# Patient Record
Sex: MALE | Race: WHITE | NOT HISPANIC OR LATINO | Employment: FULL TIME | URBAN - METROPOLITAN AREA
[De-identification: names, ages, dates, MRNs, and addresses within clinical notes are randomized per-mention and may not be internally consistent; named-entity substitution may affect disease eponyms.]

---

## 2017-01-16 ENCOUNTER — COMMUNICATION - HEALTHEAST (OUTPATIENT)
Dept: INTERNAL MEDICINE | Facility: CLINIC | Age: 47
End: 2017-01-16

## 2017-03-21 ENCOUNTER — COMMUNICATION - HEALTHEAST (OUTPATIENT)
Dept: NEUROSURGERY | Facility: CLINIC | Age: 47
End: 2017-03-21

## 2017-04-20 ENCOUNTER — COMMUNICATION - HEALTHEAST (OUTPATIENT)
Dept: INTERNAL MEDICINE | Facility: CLINIC | Age: 47
End: 2017-04-20

## 2017-05-21 ENCOUNTER — COMMUNICATION - HEALTHEAST (OUTPATIENT)
Dept: INTERNAL MEDICINE | Facility: CLINIC | Age: 47
End: 2017-05-21

## 2017-05-23 ENCOUNTER — COMMUNICATION - HEALTHEAST (OUTPATIENT)
Dept: SCHEDULING | Facility: CLINIC | Age: 47
End: 2017-05-23

## 2017-07-18 ENCOUNTER — COMMUNICATION - HEALTHEAST (OUTPATIENT)
Dept: INTERNAL MEDICINE | Facility: CLINIC | Age: 47
End: 2017-07-18

## 2017-09-07 ENCOUNTER — COMMUNICATION - HEALTHEAST (OUTPATIENT)
Dept: INTERNAL MEDICINE | Facility: CLINIC | Age: 47
End: 2017-09-07

## 2017-09-07 ENCOUNTER — COMMUNICATION - HEALTHEAST (OUTPATIENT)
Dept: SCHEDULING | Facility: CLINIC | Age: 47
End: 2017-09-07

## 2017-09-07 DIAGNOSIS — M50.20 CERVICAL HERNIATED DISC: ICD-10-CM

## 2017-09-11 ENCOUNTER — COMMUNICATION - HEALTHEAST (OUTPATIENT)
Dept: INTERNAL MEDICINE | Facility: CLINIC | Age: 47
End: 2017-09-11

## 2017-09-11 ENCOUNTER — AMBULATORY - HEALTHEAST (OUTPATIENT)
Dept: INTERNAL MEDICINE | Facility: CLINIC | Age: 47
End: 2017-09-11

## 2017-09-11 DIAGNOSIS — M54.2 NECK PAIN: ICD-10-CM

## 2017-09-12 ENCOUNTER — AMBULATORY - HEALTHEAST (OUTPATIENT)
Dept: INTERNAL MEDICINE | Facility: CLINIC | Age: 47
End: 2017-09-12

## 2017-09-12 ENCOUNTER — RECORDS - HEALTHEAST (OUTPATIENT)
Dept: ADMINISTRATIVE | Facility: OTHER | Age: 47
End: 2017-09-12

## 2017-09-12 ENCOUNTER — COMMUNICATION - HEALTHEAST (OUTPATIENT)
Dept: INTERNAL MEDICINE | Facility: CLINIC | Age: 47
End: 2017-09-12

## 2017-09-12 DIAGNOSIS — M54.2 NECK PAIN: ICD-10-CM

## 2017-09-13 ENCOUNTER — COMMUNICATION - HEALTHEAST (OUTPATIENT)
Dept: NEUROSURGERY | Facility: CLINIC | Age: 47
End: 2017-09-13

## 2017-09-14 ENCOUNTER — COMMUNICATION - HEALTHEAST (OUTPATIENT)
Dept: SCHEDULING | Facility: CLINIC | Age: 47
End: 2017-09-14

## 2017-09-14 ENCOUNTER — COMMUNICATION - HEALTHEAST (OUTPATIENT)
Dept: INTERNAL MEDICINE | Facility: CLINIC | Age: 47
End: 2017-09-14

## 2017-09-15 ENCOUNTER — RECORDS - HEALTHEAST (OUTPATIENT)
Dept: ADMINISTRATIVE | Facility: OTHER | Age: 47
End: 2017-09-15

## 2017-09-15 ENCOUNTER — AMBULATORY - HEALTHEAST (OUTPATIENT)
Dept: NEUROSURGERY | Facility: CLINIC | Age: 47
End: 2017-09-15

## 2017-09-15 DIAGNOSIS — M54.2 NECK PAIN: ICD-10-CM

## 2017-09-18 ENCOUNTER — RECORDS - HEALTHEAST (OUTPATIENT)
Dept: ADMINISTRATIVE | Facility: OTHER | Age: 47
End: 2017-09-18

## 2017-09-19 ENCOUNTER — HOSPITAL ENCOUNTER (OUTPATIENT)
Dept: RADIOLOGY | Facility: CLINIC | Age: 47
Discharge: HOME | End: 2017-09-19
Attending: NEUROLOGICAL SURGERY

## 2017-09-19 ENCOUNTER — COMMUNICATION - HEALTHEAST (OUTPATIENT)
Dept: INTERNAL MEDICINE | Facility: CLINIC | Age: 47
End: 2017-09-19

## 2017-09-19 ENCOUNTER — OFFICE VISIT - HEALTHEAST (OUTPATIENT)
Dept: NEUROSURGERY | Facility: CLINIC | Age: 47
End: 2017-09-19

## 2017-09-19 ENCOUNTER — AMBULATORY - HEALTHEAST (OUTPATIENT)
Dept: INTERNAL MEDICINE | Facility: CLINIC | Age: 47
End: 2017-09-19

## 2017-09-19 DIAGNOSIS — M50.10 CERVICAL DISC DISORDER WITH RADICULOPATHY: ICD-10-CM

## 2017-09-19 DIAGNOSIS — M54.2 NECK PAIN: ICD-10-CM

## 2017-09-19 DIAGNOSIS — F41.9 ANXIETY: ICD-10-CM

## 2017-09-19 ASSESSMENT — MIFFLIN-ST. JEOR: SCORE: 1759.83

## 2017-09-25 ENCOUNTER — HOSPITAL ENCOUNTER (OUTPATIENT)
Dept: PHYSICAL MEDICINE AND REHAB | Facility: CLINIC | Age: 47
Discharge: HOME | End: 2017-09-25
Attending: INTERNAL MEDICINE

## 2017-09-25 ENCOUNTER — OFFICE VISIT - HEALTHEAST (OUTPATIENT)
Dept: INTERNAL MEDICINE | Facility: CLINIC | Age: 47
End: 2017-09-25

## 2017-09-25 DIAGNOSIS — M50.20 CERVICAL HERNIATED DISC: ICD-10-CM

## 2017-09-25 DIAGNOSIS — M54.12 CERVICAL RADICULITIS: ICD-10-CM

## 2017-09-25 ASSESSMENT — MIFFLIN-ST. JEOR: SCORE: 1782.51

## 2017-09-26 ENCOUNTER — OFFICE VISIT - HEALTHEAST (OUTPATIENT)
Dept: PHYSICAL THERAPY | Facility: REHABILITATION | Age: 47
End: 2017-09-26

## 2017-09-26 DIAGNOSIS — M54.2 CERVICALGIA: ICD-10-CM

## 2017-09-26 DIAGNOSIS — M50.20 CERVICAL HERNIATED DISC: ICD-10-CM

## 2017-09-27 ENCOUNTER — RECORDS - HEALTHEAST (OUTPATIENT)
Dept: ADMINISTRATIVE | Facility: OTHER | Age: 47
End: 2017-09-27

## 2017-09-28 ENCOUNTER — OFFICE VISIT - HEALTHEAST (OUTPATIENT)
Dept: PHYSICAL THERAPY | Facility: REHABILITATION | Age: 47
End: 2017-09-28

## 2017-09-28 DIAGNOSIS — M50.20 CERVICAL HERNIATED DISC: ICD-10-CM

## 2017-09-28 DIAGNOSIS — M54.2 CERVICALGIA: ICD-10-CM

## 2017-09-29 ENCOUNTER — COMMUNICATION - HEALTHEAST (OUTPATIENT)
Dept: INTERNAL MEDICINE | Facility: CLINIC | Age: 47
End: 2017-09-29

## 2017-10-01 ENCOUNTER — RECORDS - HEALTHEAST (OUTPATIENT)
Dept: ADMINISTRATIVE | Facility: OTHER | Age: 47
End: 2017-10-01

## 2017-10-11 ENCOUNTER — COMMUNICATION - HEALTHEAST (OUTPATIENT)
Dept: INTERNAL MEDICINE | Facility: CLINIC | Age: 47
End: 2017-10-11

## 2017-10-20 ENCOUNTER — COMMUNICATION - HEALTHEAST (OUTPATIENT)
Dept: INTERNAL MEDICINE | Facility: CLINIC | Age: 47
End: 2017-10-20

## 2017-10-31 ENCOUNTER — OFFICE VISIT - HEALTHEAST (OUTPATIENT)
Dept: PHYSICAL THERAPY | Facility: REHABILITATION | Age: 47
End: 2017-10-31

## 2017-10-31 DIAGNOSIS — M54.2 CERVICALGIA: ICD-10-CM

## 2017-10-31 DIAGNOSIS — M50.20 CERVICAL HERNIATED DISC: ICD-10-CM

## 2017-11-03 ENCOUNTER — COMMUNICATION - HEALTHEAST (OUTPATIENT)
Dept: INTERNAL MEDICINE | Facility: CLINIC | Age: 47
End: 2017-11-03

## 2017-11-16 ENCOUNTER — COMMUNICATION - HEALTHEAST (OUTPATIENT)
Dept: INTERNAL MEDICINE | Facility: CLINIC | Age: 47
End: 2017-11-16

## 2018-01-03 ENCOUNTER — COMMUNICATION - HEALTHEAST (OUTPATIENT)
Dept: INTERNAL MEDICINE | Facility: CLINIC | Age: 48
End: 2018-01-03

## 2018-01-04 ENCOUNTER — COMMUNICATION - HEALTHEAST (OUTPATIENT)
Dept: INTERNAL MEDICINE | Facility: CLINIC | Age: 48
End: 2018-01-04

## 2018-02-16 ENCOUNTER — AMBULATORY - HEALTHEAST (OUTPATIENT)
Dept: INTERNAL MEDICINE | Facility: CLINIC | Age: 48
End: 2018-02-16

## 2018-05-03 ENCOUNTER — COMMUNICATION - HEALTHEAST (OUTPATIENT)
Dept: INTERNAL MEDICINE | Facility: CLINIC | Age: 48
End: 2018-05-03

## 2018-06-19 ENCOUNTER — AMBULATORY - HEALTHEAST (OUTPATIENT)
Dept: INTERNAL MEDICINE | Facility: CLINIC | Age: 48
End: 2018-06-19

## 2018-07-10 ENCOUNTER — COMMUNICATION - HEALTHEAST (OUTPATIENT)
Dept: INTERNAL MEDICINE | Facility: CLINIC | Age: 48
End: 2018-07-10

## 2018-09-20 ENCOUNTER — COMMUNICATION - HEALTHEAST (OUTPATIENT)
Dept: INTERNAL MEDICINE | Facility: CLINIC | Age: 48
End: 2018-09-20

## 2018-09-27 ENCOUNTER — COMMUNICATION - HEALTHEAST (OUTPATIENT)
Dept: INTERNAL MEDICINE | Facility: CLINIC | Age: 48
End: 2018-09-27

## 2018-10-02 ENCOUNTER — COMMUNICATION - HEALTHEAST (OUTPATIENT)
Dept: SCHEDULING | Facility: CLINIC | Age: 48
End: 2018-10-02

## 2018-10-03 ENCOUNTER — OFFICE VISIT - HEALTHEAST (OUTPATIENT)
Dept: INTERNAL MEDICINE | Facility: CLINIC | Age: 48
End: 2018-10-03

## 2018-10-03 ENCOUNTER — AMBULATORY - HEALTHEAST (OUTPATIENT)
Dept: INTERNAL MEDICINE | Facility: CLINIC | Age: 48
End: 2018-10-03

## 2018-10-03 DIAGNOSIS — R00.1 BRADYCARDIA: ICD-10-CM

## 2018-10-03 DIAGNOSIS — R07.9 CHEST PAIN: ICD-10-CM

## 2018-10-03 DIAGNOSIS — R10.9 FLANK PAIN: ICD-10-CM

## 2018-10-03 DIAGNOSIS — R07.89 ATYPICAL CHEST PAIN: ICD-10-CM

## 2018-10-03 LAB
ALBUMIN UR-MCNC: NEGATIVE MG/DL
APPEARANCE UR: CLEAR
ATRIAL RATE - MUSE: 69 BPM
BILIRUB UR QL STRIP: NEGATIVE
CHOLEST SERPL-MCNC: 203 MG/DL
COLOR UR AUTO: YELLOW
DIASTOLIC BLOOD PRESSURE - MUSE: 80 MMHG
FASTING STATUS PATIENT QL REPORTED: YES
FASTING STATUS PATIENT QL REPORTED: YES
GLUCOSE BLD-MCNC: 87 MG/DL (ref 70–125)
GLUCOSE UR STRIP-MCNC: NEGATIVE MG/DL
HDLC SERPL-MCNC: 49 MG/DL
HGB UR QL STRIP: NEGATIVE
INTERPRETATION ECG - MUSE: NORMAL
KETONES UR STRIP-MCNC: NEGATIVE MG/DL
LDLC SERPL CALC-MCNC: 123 MG/DL
LEUKOCYTE ESTERASE UR QL STRIP: NEGATIVE
NITRATE UR QL: NEGATIVE
P AXIS - MUSE: 70 DEGREES
PH UR STRIP: 7.5 [PH] (ref 5–8)
PR INTERVAL - MUSE: 160 MS
QRS DURATION - MUSE: 84 MS
QT - MUSE: 384 MS
QTC - MUSE: 411 MS
R AXIS - MUSE: 49 DEGREES
SP GR UR STRIP: 1.02 (ref 1–1.03)
SYSTOLIC BLOOD PRESSURE - MUSE: 128 MMHG
T AXIS - MUSE: 42 DEGREES
TRIGL SERPL-MCNC: 153 MG/DL
UROBILINOGEN UR STRIP-ACNC: NORMAL
VENTRICULAR RATE- MUSE: 69 BPM

## 2018-10-03 ASSESSMENT — MIFFLIN-ST. JEOR: SCORE: 1728.08

## 2018-10-04 ENCOUNTER — COMMUNICATION - HEALTHEAST (OUTPATIENT)
Dept: INTERNAL MEDICINE | Facility: CLINIC | Age: 48
End: 2018-10-04

## 2018-10-05 ENCOUNTER — COMMUNICATION - HEALTHEAST (OUTPATIENT)
Dept: INTERNAL MEDICINE | Facility: CLINIC | Age: 48
End: 2018-10-05

## 2018-10-22 ENCOUNTER — COMMUNICATION - HEALTHEAST (OUTPATIENT)
Dept: INTERNAL MEDICINE | Facility: CLINIC | Age: 48
End: 2018-10-22

## 2018-10-30 ENCOUNTER — COMMUNICATION - HEALTHEAST (OUTPATIENT)
Dept: INTERNAL MEDICINE | Facility: CLINIC | Age: 48
End: 2018-10-30

## 2018-11-19 ENCOUNTER — COMMUNICATION - HEALTHEAST (OUTPATIENT)
Dept: INTERNAL MEDICINE | Facility: CLINIC | Age: 48
End: 2018-11-19

## 2019-05-09 ENCOUNTER — COMMUNICATION - HEALTHEAST (OUTPATIENT)
Dept: INTERNAL MEDICINE | Facility: CLINIC | Age: 49
End: 2019-05-09

## 2019-05-13 ENCOUNTER — AMBULATORY - HEALTHEAST (OUTPATIENT)
Dept: NEUROSURGERY | Facility: CLINIC | Age: 49
End: 2019-05-13

## 2019-05-13 ENCOUNTER — COMMUNICATION - HEALTHEAST (OUTPATIENT)
Dept: NEUROSURGERY | Facility: CLINIC | Age: 49
End: 2019-05-13

## 2019-05-13 DIAGNOSIS — M54.2 NECK PAIN: ICD-10-CM

## 2019-05-13 DIAGNOSIS — M79.621 PAIN OF RIGHT UPPER ARM: ICD-10-CM

## 2019-05-20 ENCOUNTER — COMMUNICATION - HEALTHEAST (OUTPATIENT)
Dept: NEUROSURGERY | Facility: CLINIC | Age: 49
End: 2019-05-20

## 2019-05-21 ENCOUNTER — COMMUNICATION - HEALTHEAST (OUTPATIENT)
Dept: INTERNAL MEDICINE | Facility: CLINIC | Age: 49
End: 2019-05-21

## 2019-05-21 ENCOUNTER — COMMUNICATION - HEALTHEAST (OUTPATIENT)
Dept: SCHEDULING | Facility: CLINIC | Age: 49
End: 2019-05-21

## 2019-05-21 DIAGNOSIS — Z00.00 ROUTINE GENERAL MEDICAL EXAMINATION AT A HEALTH CARE FACILITY: ICD-10-CM

## 2019-05-28 ENCOUNTER — COMMUNICATION - HEALTHEAST (OUTPATIENT)
Dept: PHYSICAL MEDICINE AND REHAB | Facility: CLINIC | Age: 49
End: 2019-05-28

## 2019-05-28 DIAGNOSIS — M50.20 CERVICAL HERNIATED DISC: ICD-10-CM

## 2019-05-29 ENCOUNTER — COMMUNICATION - HEALTHEAST (OUTPATIENT)
Dept: INTERNAL MEDICINE | Facility: CLINIC | Age: 49
End: 2019-05-29

## 2019-05-30 ENCOUNTER — COMMUNICATION - HEALTHEAST (OUTPATIENT)
Dept: INTERNAL MEDICINE | Facility: CLINIC | Age: 49
End: 2019-05-30

## 2019-06-11 ENCOUNTER — COMMUNICATION - HEALTHEAST (OUTPATIENT)
Dept: INTERNAL MEDICINE | Facility: CLINIC | Age: 49
End: 2019-06-11

## 2019-06-11 DIAGNOSIS — Z00.00 ROUTINE GENERAL MEDICAL EXAMINATION AT A HEALTH CARE FACILITY: ICD-10-CM

## 2019-06-21 ENCOUNTER — COMMUNICATION - HEALTHEAST (OUTPATIENT)
Dept: INTERNAL MEDICINE | Facility: CLINIC | Age: 49
End: 2019-06-21

## 2019-06-21 DIAGNOSIS — M50.20 CERVICAL HERNIATED DISC: ICD-10-CM

## 2019-10-24 ENCOUNTER — COMMUNICATION - HEALTHEAST (OUTPATIENT)
Dept: TELEHEALTH | Facility: CLINIC | Age: 49
End: 2019-10-24

## 2019-10-24 ENCOUNTER — OFFICE VISIT - HEALTHEAST (OUTPATIENT)
Dept: INTERNAL MEDICINE | Facility: CLINIC | Age: 49
End: 2019-10-24

## 2019-10-24 DIAGNOSIS — Z00.00 ROUTINE GENERAL MEDICAL EXAMINATION AT A HEALTH CARE FACILITY: ICD-10-CM

## 2019-10-24 LAB
ALBUMIN SERPL-MCNC: 4.5 G/DL (ref 3.5–5)
ALBUMIN UR-MCNC: NEGATIVE MG/DL
ALP SERPL-CCNC: 75 U/L (ref 45–120)
ALT SERPL W P-5'-P-CCNC: 50 U/L (ref 0–45)
ANION GAP SERPL CALCULATED.3IONS-SCNC: 9 MMOL/L (ref 5–18)
APPEARANCE UR: CLEAR
AST SERPL W P-5'-P-CCNC: 42 U/L (ref 0–40)
BILIRUB SERPL-MCNC: 0.7 MG/DL (ref 0–1)
BILIRUB UR QL STRIP: NEGATIVE
BUN SERPL-MCNC: 15 MG/DL (ref 8–22)
CALCIUM SERPL-MCNC: 10.1 MG/DL (ref 8.5–10.5)
CHLORIDE BLD-SCNC: 105 MMOL/L (ref 98–107)
CHOLEST SERPL-MCNC: 215 MG/DL
CO2 SERPL-SCNC: 27 MMOL/L (ref 22–31)
COLOR UR AUTO: YELLOW
CREAT SERPL-MCNC: 0.89 MG/DL (ref 0.7–1.3)
ERYTHROCYTE [DISTWIDTH] IN BLOOD BY AUTOMATED COUNT: 12.4 % (ref 11–14.5)
FASTING STATUS PATIENT QL REPORTED: YES
GFR SERPL CREATININE-BSD FRML MDRD: >60 ML/MIN/1.73M2
GLUCOSE BLD-MCNC: 95 MG/DL (ref 70–125)
GLUCOSE UR STRIP-MCNC: NEGATIVE MG/DL
HCT VFR BLD AUTO: 49.3 % (ref 40–54)
HDLC SERPL-MCNC: 45 MG/DL
HGB BLD-MCNC: 16.7 G/DL (ref 14–18)
HGB UR QL STRIP: NEGATIVE
KETONES UR STRIP-MCNC: NEGATIVE MG/DL
LDLC SERPL CALC-MCNC: 142 MG/DL
LEUKOCYTE ESTERASE UR QL STRIP: NEGATIVE
MCH RBC QN AUTO: 31.1 PG (ref 27–34)
MCHC RBC AUTO-ENTMCNC: 33.8 G/DL (ref 32–36)
MCV RBC AUTO: 92 FL (ref 80–100)
NITRATE UR QL: NEGATIVE
PH UR STRIP: 6 [PH] (ref 5–8)
PLATELET # BLD AUTO: 237 THOU/UL (ref 140–440)
PMV BLD AUTO: 8.2 FL (ref 7–10)
POTASSIUM BLD-SCNC: 5.4 MMOL/L (ref 3.5–5)
PROT SERPL-MCNC: 7.2 G/DL (ref 6–8)
PSA SERPL-MCNC: 1.1 NG/ML (ref 0–2.5)
RBC # BLD AUTO: 5.35 MILL/UL (ref 4.4–6.2)
SODIUM SERPL-SCNC: 141 MMOL/L (ref 136–145)
SP GR UR STRIP: 1.02 (ref 1–1.03)
TRIGL SERPL-MCNC: 141 MG/DL
TSH SERPL DL<=0.005 MIU/L-ACNC: 1.39 UIU/ML (ref 0.3–5)
UROBILINOGEN UR STRIP-ACNC: NORMAL
WBC: 5.7 THOU/UL (ref 4–11)

## 2019-10-24 RX ORDER — SENNOSIDES 8.6 MG
650 CAPSULE ORAL EVERY 8 HOURS PRN
Status: SHIPPED | COMMUNITY
Start: 2019-10-24 | End: 2023-09-19

## 2019-10-24 ASSESSMENT — MIFFLIN-ST. JEOR: SCORE: 1768.91

## 2019-10-25 ENCOUNTER — COMMUNICATION - HEALTHEAST (OUTPATIENT)
Dept: INTERNAL MEDICINE | Facility: CLINIC | Age: 49
End: 2019-10-25

## 2019-11-05 ENCOUNTER — COMMUNICATION - HEALTHEAST (OUTPATIENT)
Dept: INTERNAL MEDICINE | Facility: CLINIC | Age: 49
End: 2019-11-05

## 2019-11-05 DIAGNOSIS — Z00.00 ROUTINE GENERAL MEDICAL EXAMINATION AT A HEALTH CARE FACILITY: ICD-10-CM

## 2019-11-25 ENCOUNTER — COMMUNICATION - HEALTHEAST (OUTPATIENT)
Dept: INTERNAL MEDICINE | Facility: CLINIC | Age: 49
End: 2019-11-25

## 2019-11-25 ENCOUNTER — COMMUNICATION - HEALTHEAST (OUTPATIENT)
Dept: SCHEDULING | Facility: CLINIC | Age: 49
End: 2019-11-25

## 2019-11-25 DIAGNOSIS — Z00.00 ROUTINE GENERAL MEDICAL EXAMINATION AT A HEALTH CARE FACILITY: ICD-10-CM

## 2019-12-23 ENCOUNTER — COMMUNICATION - HEALTHEAST (OUTPATIENT)
Dept: INTERNAL MEDICINE | Facility: CLINIC | Age: 49
End: 2019-12-23

## 2019-12-23 DIAGNOSIS — Z00.00 ROUTINE GENERAL MEDICAL EXAMINATION AT A HEALTH CARE FACILITY: ICD-10-CM

## 2020-05-20 ENCOUNTER — COMMUNICATION - HEALTHEAST (OUTPATIENT)
Dept: INTERNAL MEDICINE | Facility: CLINIC | Age: 50
End: 2020-05-20

## 2020-05-20 DIAGNOSIS — Z00.00 ROUTINE GENERAL MEDICAL EXAMINATION AT A HEALTH CARE FACILITY: ICD-10-CM

## 2020-05-21 RX ORDER — CYCLOBENZAPRINE HCL 5 MG
TABLET ORAL
Qty: 20 TABLET | Refills: 5 | Status: SHIPPED | OUTPATIENT
Start: 2020-05-21 | End: 2022-12-25

## 2020-09-02 ENCOUNTER — COMMUNICATION - HEALTHEAST (OUTPATIENT)
Dept: INTERNAL MEDICINE | Facility: CLINIC | Age: 50
End: 2020-09-02

## 2020-09-04 ENCOUNTER — OFFICE VISIT - HEALTHEAST (OUTPATIENT)
Dept: INTERNAL MEDICINE | Facility: CLINIC | Age: 50
End: 2020-09-04

## 2020-09-04 DIAGNOSIS — F41.9 ANXIETY: ICD-10-CM

## 2020-09-04 ASSESSMENT — PATIENT HEALTH QUESTIONNAIRE - PHQ9: SUM OF ALL RESPONSES TO PHQ QUESTIONS 1-9: 4

## 2020-09-15 ENCOUNTER — COMMUNICATION - HEALTHEAST (OUTPATIENT)
Dept: INTERNAL MEDICINE | Facility: CLINIC | Age: 50
End: 2020-09-15

## 2020-09-15 DIAGNOSIS — M50.20 CERVICAL HERNIATED DISC: ICD-10-CM

## 2020-09-16 RX ORDER — GABAPENTIN 300 MG/1
CAPSULE ORAL
Qty: 270 CAPSULE | Refills: 11 | Status: SHIPPED | OUTPATIENT
Start: 2020-09-16 | End: 2021-12-25

## 2020-12-15 ENCOUNTER — VIRTUAL VISIT (OUTPATIENT)
Dept: FAMILY MEDICINE | Facility: OTHER | Age: 50
End: 2020-12-15

## 2020-12-15 ENCOUNTER — AMBULATORY - HEALTHEAST (OUTPATIENT)
Dept: FAMILY MEDICINE | Facility: CLINIC | Age: 50
End: 2020-12-15

## 2020-12-15 DIAGNOSIS — Z20.822 SUSPECTED COVID-19 VIRUS INFECTION: ICD-10-CM

## 2020-12-15 NOTE — PROGRESS NOTES
"Date: 12/15/2020 09:40:21  Clinician: Sonal Bruno  Clinician NPI: [id number]  Patient: Otoniel Cook  Patient : 1970  Patient Address: [address] [place] [address]  Patient Phone: [phone]  Visit Protocol: URI  Patient Summary:  Otoniel is a 50 year old ( : 1970 ) male who initiated a [facility] Visit for COVID-19 (Coronavirus) evaluation and screening. When asked the question \"Please sign me up to receive news, health information and promotions. \", Otoniel responded \"No\".    When asked when his symptoms started, Otoniel reported that he does not have any symptoms.   He denies taking antibiotic medication in the past month and having recent facial or sinus surgery in the past 60 days.    Pertinent COVID-19 (Coronavirus) information  Otoniel does not work or volunteer as healthcare worker or a . In the past 14 days, Otoniel has not worked or volunteered at a healthcare facility or group living setting.   In the past 14 days, he also has not lived in a congregate living setting.   Otoniel has had a close contact with a laboratory-confirmed COVID-19 patient in the last 14 days. He was not exposed at his work. He does not know when he was exposed to the laboratory-confirmed COVID-19 patient.   Additional information about contact with COVID-19 (Coronavirus) patient as reported by the patient (free text): My child's classmate   Otoniel is not living in the same household with the COVID-19 positive patient. He was not in an enclosed space for greater than 15 minutes with the COVID-19 patient.   Since 2019, Otoniel has not been tested for COVID-19 and has had upper respiratory infection (URI) or influenza-like illness.      Date(s) of previous URI or influenza-like illness (free-text): Last 24 hours     Symptoms Otoniel experienced during previous URI or influenza-like illness as reported by the patient (free-text): Sneezing, headaches        Pertinent " medical history  He has not been told by his provider to avoid NSAIDs.   Otoniel does not have diabetes. He denies having immunosuppressive conditions (e.g., chemotherapy, HIV, organ transplant, long-term use of steroids or other immunosuppressive medications, splenectomy). He does not have severe COPD and congestive heart failure. He does not have asthma.   Otoniel needs a return to work/school note.   Weight: 200 lbs   Otoniel does not smoke or use smokeless tobacco.   Weight: 200 lbs    MEDICATIONS: gabapentin oral, ALLERGIES: gabapentin  Clinician Response:  Dear Otoniel,   Your symptoms show that you may have coronavirus (COVID-19). This illness can cause fever, cough and trouble breathing. Many people get a mild case and get better on their own. Some people can get very sick.  What should I do?  We would like to test you for this virus.   1. Please call [phone] to schedule your visit. Explain that you were referred by [facility] to have a COVID-19 test. Be ready to share your [facility] visit ID number.  * If you need to schedule in [facility] please call [phone] or for [employer] employees please call [phone].  * If you need to schedule in the [place] area please call [phone]. [place] employees call [phone].  The following will serve as your written order for this COVID Test, ordered by me, for the indication of suspected COVID [Z20.828]: The test will be ordered in [employer], our electronic health record, after you are scheduled. It will show as ordered and authorized by Randy Vernon MD.  Order: COVID-19 (Coronavirus) PCR for SYMPTOMATIC testing from [facility].   2. When it's time for your COVID test:  Stay at least 6 feet away from others. (If someone will drive you to your test, stay in the backseat, as far away from the  as you can.)   Cover your mouth and nose with a mask, tissue or washcloth.  Go straight to the testing site. Don't make any stops on the way there or back.       "3.Starting now: Stay home and away from others (self-isolate) until:   You've had no fever---and no medicine that reduces fever---for one full day (24 hours). And...   Your other symptoms have gotten better. For example, your cough or breathing has improved. And...   At least 10 days have passed since your symptoms started.       During this time, don't leave the house except for testing or medical care.   Stay in your own room, even for meals. Use your own bathroom if you can.   Stay away from others in your home. No hugging, kissing or shaking hands. No visitors.  Don't go to work, school or anywhere else.    Clean \"high touch\" surfaces often (doorknobs, counters, handles, etc.). Use a household cleaning spray or wipes. You'll find a full list of  on the EPA website: www.epa.gov/pesticide-registration/list-n-disinfectants-use-against-sars-cov-2.   Cover your mouth and nose with a mask, tissue or washcloth to avoid spreading germs.  Wash your hands and face often. Use soap and water.  Caregivers in these groups are at risk for severe illness due to COVID-19:  o People 65 years and older  o People who live in a nursing home or long-term care facility  o People with chronic disease (lung, heart, cancer, diabetes, kidney, liver, immunologic)  o People who have a weakened immune system, including those who:   Are in cancer treatment  Take medicine that weakens the immune system, such as corticosteroids  Had a bone marrow or organ transplant  Have an immune deficiency  Have poorly controlled HIV or AIDS  Are obese (body mass index of 40 or higher)  Smoke regularly   o Caregivers should wear gloves while washing dishes, handling laundry and cleaning bedrooms and bathrooms.  o Use caution when washing and drying laundry: Don't shake dirty laundry, and use the warmest water setting that you can.  o For more tips, go to www.cdc.gov/coronavirus/2019-ncov/downloads/10Things.pdf.    4.Sign up for Jose Angel Jose. We know " it's scary to hear that you might have COVID-19. We want to track your symptoms to make sure you're okay over the next 2 weeks. Please look for an email from [employer]---this is a free, online program that we'll use to keep in touch. To sign up, follow the link in the email. Learn more at [email or web address]  How can I take care of myself?   Get lots of rest. Drink extra fluids (unless a doctor has told you not to).   Take Tylenol (acetaminophen) for fever or pain. If you have liver or kidney problems, ask your family doctor if it's okay to take Tylenol.   Adults can take either:    650 mg (two 325 mg pills) every 4 to 6 hours, or...   1,000 mg (two 500 mg pills) every 8 hours as needed.    Note: Don't take more than 3,000 mg in one day. Acetaminophen is found in many medicines (both prescribed and over-the-counter medicines). Read all labels to be sure you don't take too much.   For children, check the Tylenol bottle for the right dose. The dose is based on the child's age or weight.    If you have other health problems (like cancer, heart failure, an organ transplant or severe kidney disease): Call your specialty clinic if you don't feel better in the next 2 days.       Know when to call 911. Emergency warning signs include:    Trouble breathing or shortness of breath Pain or pressure in the chest that doesn't go away Feeling confused like you haven't felt before, or not being able to wake up Bluish-colored lips or face.  Where can I get more information?    [employer] -- About COVID-19: [email or web address] [email or web address]/   CDC -- What to Do If You're Sick: www.cdc.gov/coronavirus/2019-ncov/about/steps-when-sick.html   CDC -- Ending Home Isolation: www.cdc.gov/coronavirus/2019-ncov/hcp/disposition-in-home-patients.html   CDC -- Caring for Someone: www.cdc.gov/coronavirus/2019-ncov/if-you-are-sick/care-for-someone.html   [facility] -- Interim Guidance for Hospital Discharge to Home:  www.health.[facility].mn.us/diseases/coronavirus/hcp/hospdischarge.pdf   [facility] clinical trials (COVID-19 research studies): clinicalaffairs.[facility]/umn-clinical-trials    Below are the COVID-19 hotlines at the [facility] ([facility]). Interpreters are available.    For health questions: Call 379-438-4037 or 1-606.269.9990 (7 a.m. to 7 p.m.) For questions about schools and childcare: Call 978-571-1039 or 1-676.660.6193 (7 a.m. to 7 p.m.)    Diagnosis: Contact with and (suspected) exposure to other viral communicable diseases  Diagnosis ICD: Z20.828

## 2020-12-16 ENCOUNTER — AMBULATORY - HEALTHEAST (OUTPATIENT)
Dept: FAMILY MEDICINE | Facility: CLINIC | Age: 50
End: 2020-12-16

## 2020-12-16 DIAGNOSIS — Z20.822 SUSPECTED COVID-19 VIRUS INFECTION: ICD-10-CM

## 2020-12-17 ENCOUNTER — COMMUNICATION - HEALTHEAST (OUTPATIENT)
Dept: SCHEDULING | Facility: CLINIC | Age: 50
End: 2020-12-17

## 2021-01-05 ENCOUNTER — COMMUNICATION - HEALTHEAST (OUTPATIENT)
Dept: ADMINISTRATIVE | Facility: CLINIC | Age: 51
End: 2021-01-05

## 2021-01-15 ENCOUNTER — HEALTH MAINTENANCE LETTER (OUTPATIENT)
Age: 51
End: 2021-01-15

## 2021-01-24 ENCOUNTER — COMMUNICATION - HEALTHEAST (OUTPATIENT)
Dept: INTERNAL MEDICINE | Facility: CLINIC | Age: 51
End: 2021-01-24

## 2021-01-24 DIAGNOSIS — Z00.00 ROUTINE GENERAL MEDICAL EXAMINATION AT A HEALTH CARE FACILITY: ICD-10-CM

## 2021-01-24 RX ORDER — IBUPROFEN 600 MG/1
TABLET, FILM COATED ORAL
Qty: 270 TABLET | Refills: 11 | Status: SHIPPED | OUTPATIENT
Start: 2021-01-24 | End: 2022-06-24

## 2021-02-23 ENCOUNTER — OFFICE VISIT - HEALTHEAST (OUTPATIENT)
Dept: INTERNAL MEDICINE | Facility: CLINIC | Age: 51
End: 2021-02-23

## 2021-02-23 DIAGNOSIS — R10.84 ABDOMINAL PAIN, GENERALIZED: ICD-10-CM

## 2021-02-23 DIAGNOSIS — Z00.00 ROUTINE GENERAL MEDICAL EXAMINATION AT A HEALTH CARE FACILITY: ICD-10-CM

## 2021-02-23 DIAGNOSIS — M25.552 HIP PAIN, LEFT: ICD-10-CM

## 2021-02-23 LAB
ALBUMIN SERPL-MCNC: 4.3 G/DL (ref 3.5–5)
ALBUMIN UR-MCNC: NEGATIVE MG/DL
ALP SERPL-CCNC: 66 U/L (ref 45–120)
ALT SERPL W P-5'-P-CCNC: 112 U/L (ref 0–45)
ANION GAP SERPL CALCULATED.3IONS-SCNC: 9 MMOL/L (ref 5–18)
APPEARANCE UR: CLEAR
AST SERPL W P-5'-P-CCNC: 56 U/L (ref 0–40)
BILIRUB SERPL-MCNC: 0.7 MG/DL (ref 0–1)
BILIRUB UR QL STRIP: NEGATIVE
BUN SERPL-MCNC: 13 MG/DL (ref 8–22)
CALCIUM SERPL-MCNC: 9.2 MG/DL (ref 8.5–10.5)
CHLORIDE BLD-SCNC: 102 MMOL/L (ref 98–107)
CHOLEST SERPL-MCNC: 240 MG/DL
CO2 SERPL-SCNC: 26 MMOL/L (ref 22–31)
COLOR UR AUTO: YELLOW
CREAT SERPL-MCNC: 0.86 MG/DL (ref 0.7–1.3)
ERYTHROCYTE [DISTWIDTH] IN BLOOD BY AUTOMATED COUNT: 12.4 % (ref 11–14.5)
FASTING STATUS PATIENT QL REPORTED: YES
GFR SERPL CREATININE-BSD FRML MDRD: >60 ML/MIN/1.73M2
GLUCOSE BLD-MCNC: 89 MG/DL (ref 70–125)
GLUCOSE UR STRIP-MCNC: NEGATIVE MG/DL
HCT VFR BLD AUTO: 47.4 % (ref 40–54)
HDLC SERPL-MCNC: 43 MG/DL
HGB BLD-MCNC: 16.3 G/DL (ref 14–18)
HGB UR QL STRIP: NEGATIVE
KETONES UR STRIP-MCNC: NEGATIVE MG/DL
LDLC SERPL CALC-MCNC: 143 MG/DL
LEUKOCYTE ESTERASE UR QL STRIP: NEGATIVE
MCH RBC QN AUTO: 31 PG (ref 27–34)
MCHC RBC AUTO-ENTMCNC: 34.4 G/DL (ref 32–36)
MCV RBC AUTO: 90 FL (ref 80–100)
NITRATE UR QL: NEGATIVE
PH UR STRIP: 6 [PH] (ref 5–8)
PLATELET # BLD AUTO: 223 THOU/UL (ref 140–440)
PMV BLD AUTO: 10.2 FL (ref 7–10)
POTASSIUM BLD-SCNC: 4.3 MMOL/L (ref 3.5–5)
PROT SERPL-MCNC: 7 G/DL (ref 6–8)
PSA SERPL-MCNC: 1.4 NG/ML (ref 0–2.5)
RBC # BLD AUTO: 5.26 MILL/UL (ref 4.4–6.2)
SODIUM SERPL-SCNC: 137 MMOL/L (ref 136–145)
SP GR UR STRIP: <=1.005 (ref 1–1.03)
TRIGL SERPL-MCNC: 269 MG/DL
TSH SERPL DL<=0.005 MIU/L-ACNC: 3.71 UIU/ML (ref 0.3–5)
UROBILINOGEN UR STRIP-ACNC: NORMAL
WBC: 8.2 THOU/UL (ref 4–11)

## 2021-02-23 ASSESSMENT — MIFFLIN-ST. JEOR: SCORE: 1832.41

## 2021-02-25 ENCOUNTER — TRANSFERRED RECORDS (OUTPATIENT)
Dept: HEALTH INFORMATION MANAGEMENT | Facility: CLINIC | Age: 51
End: 2021-02-25

## 2021-02-26 ENCOUNTER — COMMUNICATION - HEALTHEAST (OUTPATIENT)
Dept: INTERNAL MEDICINE | Facility: CLINIC | Age: 51
End: 2021-02-26

## 2021-02-26 DIAGNOSIS — E88.810 METABOLIC SYNDROME: ICD-10-CM

## 2021-02-26 RX ORDER — ROSUVASTATIN CALCIUM 5 MG/1
5 TABLET, COATED ORAL AT BEDTIME
Qty: 90 TABLET | Refills: 3 | Status: SHIPPED | OUTPATIENT
Start: 2021-02-26 | End: 2023-09-19

## 2021-03-10 ENCOUNTER — COMMUNICATION - HEALTHEAST (OUTPATIENT)
Dept: INTERNAL MEDICINE | Facility: CLINIC | Age: 51
End: 2021-03-10

## 2021-05-25 ENCOUNTER — RECORDS - HEALTHEAST (OUTPATIENT)
Dept: ADMINISTRATIVE | Facility: OTHER | Age: 51
End: 2021-05-25

## 2021-05-27 ASSESSMENT — PATIENT HEALTH QUESTIONNAIRE - PHQ9: SUM OF ALL RESPONSES TO PHQ QUESTIONS 1-9: 4

## 2021-05-28 ENCOUNTER — RECORDS - HEALTHEAST (OUTPATIENT)
Dept: ADMINISTRATIVE | Facility: CLINIC | Age: 51
End: 2021-05-28

## 2021-05-28 NOTE — TELEPHONE ENCOUNTER
Controlled Substance Refill Request  Medication:   Requested Prescriptions     Pending Prescriptions Disp Refills     HYDROcodone-acetaminophen 5-325 mg per tablet [Pharmacy Med Name: HYDROCOD-ACETAMIN 5-325 TAB 5-325 TAB] 12 tablet 0     Sig: TAKE 1 TABLET BY MOUTH 2 TIMES A DAY.     Date Last Fill: not on med list  Pharmacy: [facility]   Submit electronically to pharmacy  Controlled Substance Agreement on File:   Encounter-Level CSA Scan Date:    There are no encounter-level csa scan date.       Last office visit: Last office visit pertaining to requested medication was 10/3/18.

## 2021-05-28 NOTE — TELEPHONE ENCOUNTER
Called and spoke with Oscar who reports a new onset of pain in his right arm since last week. Denies sensory or motor issues. Will try a course of steroids. Enc to take ES Tylenol reminded about ice/heat, positioning. F/u on 6/7/2019.  Vero Cuellar RN, CNRN

## 2021-05-28 NOTE — TELEPHONE ENCOUNTER
Called pt and asked why he needs this medication as it is not on his medication list. Pt has right shoulder pain down to the palm of his hand. He has been using Gabapentin and Ibuprofen for the pain and it's not helping. Please advise on Monday.

## 2021-05-28 NOTE — TELEPHONE ENCOUNTER
Patient coming in on 6/7/19 to see Dr. Lambert. Previous laminectomy leandro Lambert. No recent imaging. Pls order imaging needed for appt.    Also, patient inquired if Dr. Lambert would be willing to order a steroid taper since patient is in a great deal of pain.    Pls call [phone].    **Patient does not have insurance until 6/1/19 and it will be [facility]. Pt does not have any ID numbers yet.

## 2021-05-28 NOTE — TELEPHONE ENCOUNTER
"Patient Returning Call  Reason for call:  Patient called back.  Information relayed to patient:  Informed of message listed below.  Patient states he does not have insurance until the first of the month. Is asking \"Tuyet\" specifically to call him back today.  Patient has additional questions:  Yes  If YES, what are your questions/concerns:  As above.  Okay to leave a detailed message?: Yes  "

## 2021-05-28 NOTE — TELEPHONE ENCOUNTER
Patient is calling to get a refill of the steroid that was prescribed to him by Dr. Lambert as it helps a lot with the pain. [facility] is the best pharmacy. Best call back number [phone], please inform patient either way if it is going to be refilled or if it is not.

## 2021-05-28 NOTE — TELEPHONE ENCOUNTER
Called and spoke with Oscar who reported a moderate pain in his neck, shoulder, elbow, forearm and fingers. Took a course of steroids with some relief. Instructed Oscar to try OTC remedies like Lidocaine patches, Salonpas, IcyHot, etc. Reminded about ice/heat, positioning. F/u appt on 6/6/2019.  Vero Cuellar RN, CNRN

## 2021-05-29 NOTE — TELEPHONE ENCOUNTER
Evelyn for trazodone 50 mg tablets number 30 tablets take 1 tablet at bedtime with 2 refills.  Please call patient and his pharmacy.

## 2021-05-29 NOTE — TELEPHONE ENCOUNTER
New Appointment Needed  What is the reason for the visit:    OV  Provider Preference: PCP only  How soon do you need to be seen?: 06/03/19 only  Waitlist offered?: No  Okay to leave a detailed message:  Yes      Patient stating he has ruptured disc in neck that Rosalia is aware of.  Patient states he is taking gabapentin for this and will be out after Monday. Patient states that he will only see Dr. Lindsey, and only Monday 06/03.  Dr. Lindsey only has 1 INF/EDF slot open that day.

## 2021-05-29 NOTE — TELEPHONE ENCOUNTER
Dr. Lindsey,  Spoke with the patient and relayed message below.  Patient would like to know if there is anything else that he might be able to take as he states that Trazodone has given him terrible nightmares in the past.  Bella LARA, MICHEL/CMT....................2:39 PM

## 2021-05-29 NOTE — TELEPHONE ENCOUNTER
He could try like I use Benadryl 25 mg at bedtime over-the-counter or melatonin 1 to 6 mg at bedtime over-the-counter these can be used in conjunction with each other.

## 2021-05-29 NOTE — TELEPHONE ENCOUNTER
Ok but I have to leave early that day  My last patient on Monday 6-3-19 should be 2:00pm and there are pts on my schedule at 2:40pm and 2:20 pm ; please move them up in the day

## 2021-05-29 NOTE — TELEPHONE ENCOUNTER
PERFECTOI - called patient back to offer appt for Monday. Patient reports that he is actually scheduled to see Dr. Lambert on 6/6/19 and will see what his recommendations are. Patient will plan to f/u appt appt with Dr. Lambert.

## 2021-05-29 NOTE — TELEPHONE ENCOUNTER
New Appointment Needed  What is the reason for the visit:  Same day/next day  Same Date/Next Day Appt Request  What is the reason for your visit?: neck pain. Possible disk in back per patient    Provider Preference: PCP only  How soon do you need to be seen?: tomorrow  Waitlist offered?: No  Okay to leave a detailed message:  Yes

## 2021-05-29 NOTE — TELEPHONE ENCOUNTER
"Patient calling stating \"I think I ruptured another disk in my neck, I had surgery several years ago, long story short I need something for sleep, I'm seeing Dr. Lambert the surgeon on June 6th and would like Dr. Lindsey to prescribe me something to help me sleep at night. Dr. Lindsey is aware of my symptoms\"    Per protocol, PCP to respond to patients requests for a new prescription for sleep within 24 hours. Please advise.    Reason for Disposition    Caller requesting a NON-URGENT new prescription or refill and triager unable to refill per unit policy    Protocols used: MEDICATION QUESTION CALL-A-AH      "

## 2021-05-29 NOTE — TELEPHONE ENCOUNTER
PLEASE OFFER PT 6/3/2019/ @9 :20 OR 9:40 SPOKE WIT PT AND HE NEEDS to CHECK SCHEDULE BEFOR MAKING APPT

## 2021-05-31 VITALS — BODY MASS INDEX: 25.73 KG/M2 | WEIGHT: 190 LBS | HEIGHT: 72 IN

## 2021-05-31 VITALS — WEIGHT: 195 LBS | HEIGHT: 72 IN | BODY MASS INDEX: 26.41 KG/M2

## 2021-05-31 VITALS — WEIGHT: 195 LBS | BODY MASS INDEX: 26.45 KG/M2

## 2021-06-02 VITALS — WEIGHT: 183 LBS | HEIGHT: 72 IN | BODY MASS INDEX: 24.79 KG/M2

## 2021-06-02 NOTE — PROGRESS NOTES
Office Visit - Physical    Otoniel Cook   49 y.o. male    Date of Visit: 10/24/2019    Chief Complaint   Patient presents with     Annual Exam     Physical Exam   fasting     Pain     right shoulder and right hip     Back Pain     lower       Subjective: Physical examination.    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  Right shoulder pain for 2 months months chest x-ray.  Smokes cigarettes about 2 packs/week.    12-18 beers per week occasional vodka.    The patient has no known drug allergies.    ROS: A comprehensive review of systems was performed and was otherwise negative    Medications:   Prior to Admission medications    Medication Sig Start Date End Date Taking? Authorizing Provider   acetaminophen (TYLENOL) 650 MG CR tablet Take 650 mg by mouth every 8 (eight) hours as needed for pain.   Yes PROVIDER, HISTORICAL   ibuprofen (ADVIL,MOTRIN) 600 MG tablet TAKE 1 TABLET THREE TIMES A DAY. 9/20/18  Yes Neeraj Lindsey MD   gabapentin (NEURONTIN) 300 MG capsule TAKE 1 CAPSULE (300 MG TOTAL) BY MOUTH AT BEDTIME. 6/12/19 6/11/20  Neeraj Lindsey MD   gabapentin (NEURONTIN) 300 MG capsule Take 600 mg three times daily. 6/21/19   Duke Rodas MD   hydrOXYzine pamoate (VISTARIL) 25 MG capsule TAKE 1 CAPSULE (25 MG) BY MOUTH 4 TIMES A DAY AS NEEDED 11/1/18   Neeraj Lindsey MD   traZODone (DESYREL) 50 MG tablet Take 1 tablet (50 mg total) by mouth at bedtime. 5/21/19   Neeraj Lindsey MD   cyclobenzaprine (FLEXERIL) 5 MG tablet TAKE 1 TABLET BY MOUTH TWICE A DAY 11/20/18 10/24/19  Neeraj Lindsey MD       Allergies:No Known Allergies    Immunizations:   Immunization History   Administered Date(s) Administered     Td,adult,historic,unspecified 06/26/2006     Tdap 06/26/2006     Tetanus Toxoid, Adsorbed 04/14/2015       Health Maintenance: Immunizations reviewed and up-to-date.    Past Medical History: History of vasovagal syncope and history of  multiple concussions.  Atypical chest pain with negative work-up.  History of cervical disc disease requiring surgery Dr. Pastor Lambert.  C5.    Past Surgical History: C5 cervical neck surgery by Dr. Pastor Lambert.  Postoperative pain problems.    Family History: Mother and dad both living father had Pancoast tumor resected late 70s [facility].  Mother living and well late 70s as well.  2 children ages 17 and 14 both enjoyed good health.  2 daughters.  Patient's wife is well also patient of this examiner.    Social History: .  Feeling the stress of his job the physical aspects are affecting the joints and hip shoulder right side.    Exam Chest clear to auscultation and percussion.  Heart tones regular rhythm without murmur rub or gallop.  Abdomen soft nontender no organomegaly.  No peritoneal signs.  Extremities free of edema cyanosis or clubbing.  Neck veins nondistended no thyromegaly or scleral icterus noted, carotids full.  Skin warm and dry easily conversant good spirited.  Normal intelligence.  Neurologically intact no gross localizing findings.  Skin negative lymph negative neuro negative psych normal HEENT negative back straight no severe spine tenderness good pulse noted in all 4 extremities genital rectal exam negative no carotid bruits or thyromegaly.    Assessment and Plan  General medical examination at healthcare facility check hemogram comprehensive metabolic profile plus urinalysis lipid panel PSA TSH and chest x-ray today.    The following high BMI interventions were performed this visit: encouragement to exercise    Neeraj Lindsey MD    Patient Active Problem List   Diagnosis     Sinus Bradycardia     Atypical Chest Pain     General medical exam     Vasovagal syncope     H/O multiple concussions     Cervical herniated disc

## 2021-06-03 VITALS
SYSTOLIC BLOOD PRESSURE: 120 MMHG | HEIGHT: 72 IN | BODY MASS INDEX: 26.01 KG/M2 | WEIGHT: 192 LBS | HEART RATE: 66 BPM | OXYGEN SATURATION: 97 % | DIASTOLIC BLOOD PRESSURE: 72 MMHG

## 2021-06-03 NOTE — TELEPHONE ENCOUNTER
RN cannot approve Refill Request    RN can NOT refill this medication med is not covered by policy/route to provider. Last office visit: 9/25/2017 Neeraj Lindsey MD Last Physical: 10/24/2019 Last MTM visit: Visit date not found Last visit same specialty: 10/3/2018 Koki Reyna MD.  Next visit within 3 mo: Visit date not found  Next physical within 3 mo: Visit date not found      Grace Godoy, [facility] Triage/Med Refill 11/5/2019    Requested Prescriptions   Pending Prescriptions Disp Refills     ibuprofen (ADVIL,MOTRIN) 600 MG tablet [Pharmacy Med Name: IBUPROFEN 600 MG TABLET 600 TAB] 270 tablet 11     Sig: TAKE 1 TABLET THREE TIMES A DAY.       There is no refill protocol information for this order

## 2021-06-03 NOTE — TELEPHONE ENCOUNTER
RN Triage:   Patient is calling in stating he thinks he has a sinus infection. He has sinus pressure and congested under the eyes and on the forehead. NO fever. Patient stated that he had a cold last week on Monday. Patient had ear pain last week in the right ear. He stated he had dizziness/lightheaded yesterday morning. He has greenish drainage from the nose. Patient is going out of town, driving to [place] with his family. [facility]. Patient declined an appointment to be seen. He is asking for a z-pack to be called in, in case he needs it while out of town. Patient deferred anymore triage questions and just wanted a message sent.       Contact number [phone] ok to leave a detailed message.   Please advise.     Dominique Curran RN, BSN [facility] Triage Nurse        Reason for Disposition    Earache    Protocols used: SINUS PAIN AND CONGESTION-A-[place]

## 2021-06-03 NOTE — TELEPHONE ENCOUNTER
Patient notified and prescription set up in a separate refill encounter.    Clementina Melvin, CMA

## 2021-06-04 NOTE — TELEPHONE ENCOUNTER
Medication Request  Medication name:   1. Cylclobenzaprine    Pharmacy Name and Location:   [facility],  [address]    Reason for request: Pt. Is having intense back spasms As he pulled a muscle in ihs back this am .  Please advise     When did you use medication last?:    Past year     Patient offered appointment:  Patient Declined .     Okay to leave a detailed message: yes

## 2021-06-04 NOTE — TELEPHONE ENCOUNTER
Dr. Lindsey,  Please advise on a dose, sig, and quantity of cyclobenzaprine.  Thank you.  Bella LARA, MICHEL/CMT....................10:12 AM

## 2021-06-04 NOTE — TELEPHONE ENCOUNTER
Evelyn Jaime for prescription requested below?  If so, please advise on a dose, sig, and quantity.  Thank you.  Bella LARA, MICHEL/CMT....................9:52 AM

## 2021-06-04 NOTE — TELEPHONE ENCOUNTER
Spoke with the patient and relayed message below from Dr. Lindsey.  He verbalized understanding and had no further questions at this time.    Prescription has been set up for Dr. Lindsey to review.  Bella LARA, MICHEL/CMT....................10:49 AM

## 2021-06-04 NOTE — TELEPHONE ENCOUNTER
Cyclobenzaprine 5 mg tablets number 20 tablets take 1 tablet twice daily and 1 refill.  Okay to use cyclobenzaprine with arthritis strength acetaminophen 2 tablets 3 times a day and /or Advil 3 tablets twice daily with food.

## 2021-06-05 VITALS
WEIGHT: 206 LBS | HEIGHT: 72 IN | TEMPERATURE: 96.8 F | HEART RATE: 61 BPM | BODY MASS INDEX: 27.9 KG/M2 | OXYGEN SATURATION: 96 % | DIASTOLIC BLOOD PRESSURE: 88 MMHG | SYSTOLIC BLOOD PRESSURE: 126 MMHG

## 2021-06-11 NOTE — TELEPHONE ENCOUNTER
Who is calling:  Patient  Reason for Call:  Patient had concerns about his anxiety, was offered appointment, patient denied  Date of last appointment with primary care: 10/24/2019  Okay to leave a detailed message: Yes
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
## 2021-06-11 NOTE — TELEPHONE ENCOUNTER
RN cannot approve Refill Request    RN can NOT refill this medication med is not covered by policy/route to provider. Last office visit: Visit date not found Last Physical: Visit date not found Last MTM visit: Visit date not found Last visit same specialty: 10/3/2018 Koki Reyna MD.  Next visit within 3 mo: Visit date not found  Next physical within 3 mo: Visit date not found      Renetta Fung, [facility] Triage/Med Refill 9/16/2020    Requested Prescriptions   Pending Prescriptions Disp Refills     gabapentin (NEURONTIN) 300 MG capsule [Pharmacy Med Name: GABAPENTIN 300 MG CAPSULE 300 CAP] 270 capsule 11     Sig: TAKE 2 CAPSULES (600 MG) BY MOUTH THREE TIMES DAILY.       Gabapentin/Levetiracetam/Tiagabine Refill Protocol  Passed - 9/15/2020 10:50 AM        Passed - PCP or prescribing provider visit in past 12 months or next 3 months     Last office visit with prescriber/PCP: Visit date not found OR same dept: Visit date not found OR same specialty: 10/3/2018 Koki Reyna MD  Last physical: Visit date not found Last MTM visit: Visit date not found   Next visit within 3 mo: Visit date not found  Next physical within 3 mo: Visit date not found  Prescriber OR PCP: Duke Rodas MD  Last diagnosis associated with med order: 1. Cervical herniated disc  - gabapentin (NEURONTIN) 300 MG capsule [Pharmacy Med Name: GABAPENTIN 300 MG CAPSULE 300 CAP]; TAKE 2 CAPSULES (600 MG) BY MOUTH THREE TIMES DAILY.  Dispense: 270 capsule; Refill: 11    If protocol passes may refill for 12 months if within 3 months of last provider visit (or a total of 15 months).

## 2021-06-11 NOTE — TELEPHONE ENCOUNTER
Spoke with patient and he has been feeling anxious recently and wants to get a prescription for these occasions. Please advise. Prescription should be sent to the [facility] in the chart.    Clementina Melvin, CMA

## 2021-06-13 NOTE — PROGRESS NOTES
"Assessment:   Otoniel Cook is a 47 y.o. y.o. male who is otherwise healthy who presents today for follow-up regarding week history of right neck pain with radiation into the right upper extremity area the patient has a history of a left C6-7 laminectomy in December 29, 2015 with Dr. Lambert.  An updated MRI cervical spine shows a right broad-based disc protrusion at C4-5 with mild encroachment upon the right C5 nerve root.  Patient also has chronic severe bilateral foraminal stenosis at C5-6, but this has not changed since 2004.  The patient had a slightly diminished right biceps reflex but was otherwise neurologically intact.       Plan:     A shared decision making plan was used.  The patient's values and choices were respected.  The following represents what was discussed and decided upon by the physician assistant and the patient.      1.  DIAGNOSTIC TESTS: I reviewed the MRI cervical spine.  No further diagnostic tests were ordered.    2.  PHYSICAL THERAPY: I placed an order for the patient begin physical therapy at the [place] location of [facility] rehab.    3.  MEDICATIONS:    -I refilled the patient's gabapentin and given a dosage titration chart so that he can increase his dose of maximum of 900 mg 3 times daily.  -I provided a prescription for Medrol Dosepak.  I asked the patient to stop using ibuprofen while he is taking his Medrol Dosepak.  -I refilled the patient's ibuprofen 800 mg 3 times daily as needed.  He should resume this after he completes his Medrol Dosepak.  -The patient asked for additional pain relieving medication to \"take the edge off\" and help him sleep.  He has gotten Vicodin from his primary care provider.  I told the patient I do not feel comfortable providing additional opiates.  I told the patient I would prefer to treat his pain aggressively with nonopiate pain relieving medications, physical therapy, and potentially interventional pain management.      4.  INTERVENTIONS: No " interventions were ordered.  The patient fails to improve with conservative treatment, recommend a right C4-5 transforaminal epidural steroid injection.  If this does not help, we could consider a C5-6 transforaminal epidural steroid injection.    5.  PATIENT EDUCATION: The patient shared with me that he has been out of work since September 11 due to this pain.  The patient states that he dropped off disability paperwork and his primary care provider's office last week.  She would like to get back to work as soon as possible.  I told the patient that I believe he could return to work with light duty restrictions.    6.  FOLLOW-UP: I will see the patient back in the clinic in 3 weeks for follow-up.  If he has any questions or concerns in the meantime, he should not hesitate to contact our clinic.    Subjective:     Otoniel Cook is a 47 y.o. male who presents today for follow-up regarding a 3-4 week history of right neck pain with radiation into the right upper extremity.  The patient 2015 for left upper extremity radicular pain.  He went on to have a left C6-7 laminectomy with Dr. Lambert on December 29, 2015.  The patient reports that it took him about 9 weeks to recover from that surgery, but ultimately his left upper extremity pain resolved and he is pleased with his outcome.  3-4 weeks ago, the patient began to experience right-sided pain.  He woke up with pain.  He thought that he is slept on the neck wrong.  It has become progressively more severe.  Over the past 3 days, he has also noticed similar but less severe pain in the left upper extremity.    The patient was of right neck pain.  The pain radiates into the right shoulder and down the right deltoid.  The patient states that the pain almost always ends proximal to the elbow.  He has had a couple of occasions with the pain extended into the extensor forearm.  He describes the pain as a deep, aching pain.  It is worse at night.  He is having difficulty  sleeping because of the pain.  His pain is also aggravated with sitting and coughing/sneezing.  His pain is alleviated with resting his right arm on top of his head and sitting in a recliner.  The patient states that for the past 3 days he has noticed mild pain in the left deltoid as well.  The patient says that he has had a couple episodes of tingling in the right palm.  He denies any numbness.  He feels weak in the right arm with reaching.  He rates his pain as a 5-10 out of 10.    The patient has not had any physical therapy for his neck.  I had referred him to physical therapy prior to his surgery in 2015.  I do not see any record that he actually went to physical therapy.  He did not do physical therapy after surgery.  The patient tried an epidural steroid injection before his surgery but he has not had any interventional pain management postoperatively.  The patient is currently taking gabapentin 300 mg every 8 hours and ibuprofen 800 mg every 8 hours.  He was prescribed Vicodin by his primary care provider but has run out.    The patient works as an .  He is currently working at [employer].  The patient states that he has not gone to work since September 11, 2017 because of his pain.  The patient states that he dropped off short-term disability paperwork at his primary care provider's office last week.    Past medical history is reviewed and is pertinent for the surgery in 2015 with Dr. Lambert.    Family history is reviewed and is unchanged in the interim.    Review of Systems:  Positive for numbness/tingling, weakness, poor sleep quality, back pain, joint pain, worry.  Negative loss of bowel/bladder control, footdrop, nausea/vomiting, balance changes, blurry vision.     Objective:   CONSTITUTIONAL:  Vital signs as above.  No acute distress.  The patient is well nourished and well groomed.    PSYCHIATRIC:  The patient is awake, alert, oriented to person, place and time.  The patient is  answering questions appropriately with clear speech.  Normal affect.  HEENT: Normocephalic, atraumatic.  Sclera clear.  Neck is supple.  SKIN:  Skin over the face, neck bilateral upper extremities is clean, dry, intact without rashes.  MUSCULOSKELETAL: The patient has 5/5 strength for the bilateral shoulder abductors, elbow flexors/extensors, wrist extensors, finger flexors/abductors.  Cervical extension is mildly restricted.  Otherwise cervical range of motion is within normal limits.  The patient did have a positive Spurling sign on the right.  NEUROLOGICAL: 1+ right and 2+ left biceps reflexes.  1+ bilateral triceps and brachioradialis reflexes.  .  Negative Regalado's bilaterally.  Sensation to light touch is intact over bilateral upper extremities throughout.    RESULTS: I reviewed the MRI cervical spine from [facility] dated September 12, 2017.  At C4-5 there is a new right-sided disc protrusion with mild encroachment on the right C5 nerve root.  The patient has chronic severe bilateral foraminal stenosis at C5-6 which is unchanged since his previous scan in 2004.  There are postoperative changes of a C6-7 laminectomy.  Please see report for further details.
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
## 2021-06-13 NOTE — PROGRESS NOTES
"[facility] Daily Progress     Patient Name: Otoniel Cook  Date: 9/28/2017  Visit #: 2/10 visits   PTA visit #:    Referral Diagnosis: Cervical radiculitis   Referring provider: Neeraj Lindsey MD  Visit Diagnosis:     ICD-10-CM    1. Cervical herniated disc M50.20    2. Cervicalgia M54.2          Assessment:   Patient reports no changes in pain since starting PT. He did not tolerate supine or hooklying positions today, so traction and manual therapy were not performed. HEP was progressed with gentle strengthening.   Patient is appropriate to continue with skilled physical therapy intervention, as indicated by initial plan of care.    Goal Status:  Pt. will be independent with home exercise program in : 4 weeks  Pt. will improve posture : and demonstrate posture with minimal to no cuing;2 minutes;in sitting;in standing;in 4 weeks  Patient Turn Head: for driving;for watching traffic;for conversation;for computer;for work;with less pain;in 4 weeks  Patient will look up / down: for shaving;for reading;for other;with less pain;in 4 weeks  No Data Recorded    Plan / Patient Education:     Continue with initial plan of care.  Progress with home program as tolerated.    Subjective:   No relief in symptoms thus far. Patient c/o intense B UE pain and cervical pain.  Patient would like a TENS unit.     Objective:     Pt does not tolerate supine or hooklying position due to L UE pain.    Treatment Today     TREATMENT MINUTES COMMENTS   Evaluation     Self-care/ Home management     Manual therapy     Neuromuscular Re-education     Therapeutic Activity     Therapeutic Exercises 25 Advanced HEP with the following:  Exercises:  Exercise #1: Chin tuck - cervical retraction in sitting  Comment #1: HEP x 5 seconds x 10 reps   Exercise #2: Seated scap retraction with ER L1 band  Comment #2: HEP x 5 seconds x 10 reps  Exercise #3: Pec stretch \"w\" in doorway  Comment #3: HEP x 30 seconds x 2-3 reps   Exercise #5: " Median nerve abduction   Comment #5: HEP x 10 reps B  Exercise #6: Standing rows  Comment #6: HEP x 3 seconds 10 reps L3 band      Gait training     Modality__________________                Total 25    Blank areas are intentional and mean the treatment did not include these items.       Glenny Yoder  9/28/2017

## 2021-06-13 NOTE — PROGRESS NOTES
[facility]   Cervical Thoracic Initial Evaluation    Patient Name: Otoniel Cook  Date of evaluation: 9/27/2017  Referral Diagnosis: Cervical herniated disc  Referring provider: Rosanne Monteiro PA-C  Visit Diagnosis:     ICD-10-CM    1. Cervical herniated disc M50.20    2. Cervicalgia M54.2        Assessment:       Otoniel Cook is a 47 y.o. male who presents to therapy today with chief complaints of neck pain. Onset date of sx was 17+ years ago, got worse last month.  Pt reported h/o cervical surgery.  Pain symptoms are 5/10.  Functional impairments include looking up/down, turning head.  Pt demo's signs and sx consistent with cervicalgia.     Pt. is appropriate for skilled PT intervention as outlined in the Plan of Care (POC).  Pt. is a good candidate for skilled PT services to improve pain levels and function.    Goals:  Pt. will be independent with home exercise program in : 4 weeks  Pt. will improve posture : and demonstrate posture with minimal to no cuing;2 minutes;in sitting;in standing;in 4 weeks  Patient Turn Head: for driving;for watching traffic;for conversation;for computer;for work;with less pain;in 4 weeks  Patient will look up / down: for shaving;for reading;for other;with less pain;in 4 weeks      Patient's expectations/goals are realistic.    Barriers to Learning or Achieving Goals:  Chronicity of the problem.       Plan / Patient Instructions:        Plan of Care:   Communication with: Referral Source  Patient Related Instruction: Nature of Condition;Treatment plan and rationale;Self Care instruction;Basis of treatment;Body mechanics;Posture;Precautions;Next steps;Expected outcome  Times per Week: 2  Number of Weeks: 5  Number of Visits: 10  Discharge Planning: patient will be discharge to self care  Precautions / Restrictions : none  Therapeutic Exercise: ROM;Stretching;Strengthening  Neuromuscular Reeducation: kinesio tape;posture  Manual Therapy: soft tissue  mobilization;myofascial release  Modalities: TENS;other  Modalities: TENS home unit if beneficial    Plan for next visit: check if TENS was beneficial, probably check about a home TENS unit?, mnaul therapy, posture, cervical exercises.     Subjective:         Social information:   Living Situation:single family home and lives with others    Occupation:.   Work Status:Working full time   Equipment Available: None    History of Present Illness:    Otoniel is a 47 y.o. male who presents to therapy today with complaints of neck pain and arm tingling. Date of onset/duration of symptoms is 17+ years, got worse last month. Onset was gradual. Symptoms are intermittent and not improving. He reports  an episodic  history of similar symptoms. He describes their previous level of function as not limited    Pain Ratin  Pain rating at best: 5  Pain rating at worst: 10  Pain description: aching and in the bone , both arms.    Functional limitations are described as occurring with:   ascending and descending stairs or curbs  bending  dependent care  looking up, looking down, looking at computer and turning head  personal cares dressing lower body, donning shirt or jacket, combing hair and washing hair  reaching at shoulder height and overhead  performing routine daily activities    Patient reports benefit from:  rest           Objective:      Note: Items left blank indicates the item was not performed or not indicated at the time of the evaluation.    Patient Outcome Measures :    Neck Disability Score in %: 70   Scores range from 0-100%, where a score of 0% represents minimal pain and maximal function. The minmal clinically important difference is a score reduction of 10%.    Cervical Thoracic Examination  1. Cervical herniated disc     2. Cervicalgia       Involved side: Bilateral  Posture Observation:      General standing posture is fair.  Cervical:  Moderate forward head    Cervical ROM:    Date: 17      *Indicate scale AROM AROM AROM   Cervical Flexion 15o  10o flexed forward posture     Cervical Extension       Right Left Right Left Right Left   Cervical Sidebending 22o 15o pain       Cervical Rotation         Cervical Protraction      Cervical Retraction      Thoracic Flexion      Thoracic Extension      Thoracic Sidebending         Thoracic Rotation           Strength   4/5    Sensation   Bilateral arm tingling to elbow level          Flexibility: limited cervical mobility.    Palpation: bilateral cervical and upper trap increased muscle spasm.      Bilateral scapular winging.       Treatment Today     TREATMENT MINUTES COMMENTS   Evaluation 30 low   Self-care/ Home management     Manual therapy     Neuromuscular Re-education     Therapeutic Activity     Therapeutic Exercises 15 Cervical flexion, lateral flexion, shoulder rolls, posture   Gait training     Modality_____TENS_____________ 15 Cervical 4 electrodes ,I:2.5, PPR: cervical, good skin tolreance              Total 60    Blank areas are intentional and mean the treatment did not include these items.       PT Evaluation Code: (Please list factors)  Patient History/Comorbidities: history of chronic back pain  Examination: cervical pain  Clinical Presentation: stable  Clinical Decision Making: low    Patient History/  Comorbidities Examination  (body structures and functions, activity limitations, and/or participation restrictions) Clinical Presentation Clinical Decision Making (Complexity)   No documented Comorbidities or personal factors 1-2 Elements Stable and/or uncomplicated Low   1-2 documented comorbidities or personal factor 3 Elements Evolving clinical presentation with changing characteristics Moderate   3-4 documented comorbidities or personal factors 4 or more Unstable and unpredictable High            Christina Vidal PT  9/26/2017  8:21 AM

## 2021-06-13 NOTE — PROGRESS NOTES
Neck pain and radiates into right shoulder and right arm to a little past the elbow. Deep ache in right arm. No numbness and tingling. Minor weakness in right hand. Gait and balance are good, and denies bladder or bowel incontinence.   Otoniel has been having symptoms for almost 3 weeks but not sure if injury. No conservative treatment. He is s/p Left C6-7 hemilaminectomy and foraminotomy by Dr. Lambert in 12/2015.  NDI today is 76%  Maria M,CMA

## 2021-06-13 NOTE — PROGRESS NOTES
[facility] Daily Progress     Patient Name: Otoniel Cook  Date: 10/31/2017  Visit #: 3/10 visits   PTA visit #:    Referral Diagnosis: Cervical radiculitis   Referring provider: Neeraj Lindsey MD  Visit Diagnosis:     ICD-10-CM    1. Cervical herniated disc M50.20    2. Cervicalgia M54.2          Assessment:     Patient reports feeling better after returning to work still gets a deep ache on right arm.  Patient is compliant with home  Exercise program.  Good tolerance to cervical traction today.  Requested orders for TENS home unit , patient gave permission to fax information to [facility] once we get the MD order.   Patient is appropriate to continue with skilled physical therapy intervention, as indicated by initial plan of care.    Goal Status:  Pt. will be independent with home exercise program in : 4 weeks  Pt. will improve posture : and demonstrate posture with minimal to no cuing;2 minutes;in sitting;in standing;in 4 weeks  Patient Turn Head: for driving;for watching traffic;for conversation;for computer;for work;with less pain;in 4 weeks  Patient will look up / down: for shaving;for reading;for other;with less pain;in 4 weeks      Plan / Patient Education:     Continue with initial plan of care.  Progress with home program as tolerated.   Check home TENS unit orders.    Subjective:     Cervical pain is with the medicine is 3/10 and when he stops taking med goes up to 7/10.     Objective:     Cervical muscle spasm.  Patient is independent ambulatory.    Treatment Today     TREATMENT MINUTES COMMENTS   Evaluation     Self-care/ Home management     Manual therapy     Neuromuscular Re-education     Therapeutic Activity     Therapeutic Exercises 15 Advanced HEP with the following:  Exercises:  Exercise #1: Chin tuck - cervical retraction in sitting  Comment #1: HEP x 5 seconds x 10 reps   Exercise #2: Seated scap retraction with ER L1 band  Comment #2: HEP x 5 seconds x 10 reps  Exercise #3:  "Pec stretch \"w\" in doorway  Comment #3: HEP x 30 seconds x 2-3 reps   Exercise #5: Median nerve abduction   Comment #5: HEP x 10 reps B  Exercise #6: Standing rows  Comment #6: HEP x 3 seconds 10 reps L3 band      Gait training     Modality_____cervical traction_____________ 15 Supine static, 20 pounds, good tolerance.              Total 30    Blank areas are intentional and mean the treatment did not include these items.       Christina Chavezpool  10/31/2017    "

## 2021-06-14 NOTE — TELEPHONE ENCOUNTER
CALLED PT TO OFFER AN APPOINTMENT.  ADVISED THE PT THAT WE CAN NOT JUST SEND IN ANTIBIOTICS FOR A DOG BITE, HE NEEDS TO BE EVALUATED.   PT REFUSED TO SCHEDULE APT, STATES HE WILL GO TO [facility]

## 2021-06-14 NOTE — TELEPHONE ENCOUNTER
RN cannot approve Refill Request    RN can NOT refill this medication med is not covered by policy/route to provider. Last office visit: 9/25/2017 Neeraj Lindsey MD Last Physical: 10/24/2019 Last MTM visit: Visit date not found Last visit same specialty: 10/3/2018 Koki Reyna MD.  Next visit within 3 mo: Visit date not found  Next physical within 3 mo: Visit date not found      Leti Desai, [facility] Triage/Med Refill 1/24/2021    Requested Prescriptions   Pending Prescriptions Disp Refills     ibuprofen (ADVIL,MOTRIN) 600 MG tablet [Pharmacy Med Name: IBUPROFEN 600 MG TABLET 600 Tablet] 270 tablet 11     Sig: TAKE 1 TABLET THREE TIMES A DAY.       There is no refill protocol information for this order

## 2021-06-14 NOTE — TELEPHONE ENCOUNTER
Reason for call:  Patient reporting a symptom    Symptom or request: bit by dog    Duration (how long have symptoms been present): about an hour ago    Have you been treated for this before? No    Additional comments: Pt refused triage. I offer him an appt but he also refuse. States he's really busy with work  today and does not want to come in. Pt states Dr. Lindsey knows him for 30yrs and wants to know if he can get an antibiotic send to [facility] on [address].  Pt states there's 3 puncture wounds/marks on his elbow. Dime size bruising under the skin. No blood gushing.     Phone Number patient can be reached at:    Cell number on file:    Telephone Information:   Mobile [phone]       Best Time:  anytime    Can we leave a detailed message on this number: Yes    Call taken on 1/5/2021 at 11:25 AM by Lotus Sweet

## 2021-06-15 NOTE — TELEPHONE ENCOUNTER
Pt declined offer for telephone encounter.    Pt requesting call back from Dr Lindsey as soon as feasible, Dr Moser calls him all the time.    Pt daughter, Adali has recently tested positive for Lupus via JOHN test.    Pt wants to review his numbers with PCP, and also get referrals.    Pt wants to discuss his daughter and get recommendations for internal med provider and rheumatologist provider.    Pt is aware that PCP does not work on Wednesday and has been advised that Dr. Lindsey has a full calendar Thursday and Friday for call back expectations, Pt states Dr. Lindsey calls him all the time.

## 2021-06-15 NOTE — PROGRESS NOTES
Office Visit - Physical    Otoniel Cook   50 y.o. male    Date of Visit: 2/23/2021    Chief Complaint   Patient presents with     Annual Exam     Physical Exam     Hip Pain     ache bilateral       Subjective: Physical examination.    50-year-old male needs colonoscopy as he will turn 51 this March 2021.  Suggest colorectal surgery group Dr. BRANNON or Dr. Horace Roger at [phone].    Bilateral hip aching.    Abdominal pain that has short-lived and migratory.  No other associated abdominal or constitutional symptoms.  CT abdomen and pelvis pending    Smokes about 1 pack of cigarettes per week 18 beers per week  by trade 2 daughters both well wife well.  Allergies none known.    ROS: A comprehensive review of systems was performed and was otherwise negative    Medications:   Prior to Admission medications    Medication Sig Start Date End Date Taking? Authorizing Provider   acetaminophen (TYLENOL) 650 MG CR tablet Take 650 mg by mouth every 8 (eight) hours as needed for pain.   Yes PROVIDER, HISTORICAL   ibuprofen (ADVIL,MOTRIN) 600 MG tablet TAKE 1 TABLET THREE TIMES A DAY. 1/24/21  Yes Neeraj Lindsey MD   cyclobenzaprine (FLEXERIL) 5 MG tablet TAKE 1 TABLET BY MOUTH TWICE A DAY 5/21/20   Neeraj Lindsey MD   gabapentin (NEURONTIN) 300 MG capsule TAKE 2 CAPSULES (600 MG) BY MOUTH THREE TIMES DAILY. 9/16/20   Neeraj Lindsey MD   busPIRone (BUSPAR) 5 MG tablet Take 1 tablet (5 mg total) by mouth 3 (three) times a day. 9/4/20 2/23/21  Neeraj Lindsey MD   hydrOXYzine pamoate (VISTARIL) 25 MG capsule TAKE 1 CAPSULE (25 MG) BY MOUTH 4 TIMES A DAY AS NEEDED 11/1/18 2/23/21  Neeraj Lindsey MD       Allergies:No Known Allergies    Immunizations:   Immunization History   Administered Date(s) Administered     Td,adult,historic,unspecified 06/26/2006     Tdap 06/26/2006     Tetanus Toxoid, Adsorbed 04/14/2015       Health Maintenance: Immunizations reviewed and up-to-date.    Past  Medical History: History of cervical disc disease requiring surgery a level C5 Dr. Pastor Lambert presiding.    History of hyperlipidemia without target organ damage related to same.    Past Surgical History: No anesthetic complications with any prior surgery.    Family History: 2 daughters well wife well.  Parents both living mother age 73 with history of diverticulitis.    Father early 80s with history of Pancoast tumor of the lung resected no sign of recurrence.  He to his had cervical disc disease as well as lumbar disc disease requiring surgery for both.    Social History:  by trade.    Exam Chest clear to auscultation and percussion.  Heart tones regular rhythm without murmur rub or gallop.  Abdomen soft nontender no organomegaly.  No peritoneal signs.  Extremities free of edema cyanosis or clubbing.  Neck veins nondistended no thyromegaly or scleral icterus noted, carotids full.  Skin warm and dry easily conversant good spirited.  Normal intelligence.  Neurologically intact no gross localizing findings.  Rest of examination negative skin negative lymph negative neuro negative psych normal HEENT negative back straight no severe spine tenderness HEENT examination unremarkable well-healed scar upper thoracic lower cervical area from prior C5 neck surgery Dr. Pastor Lambert presiding.  Postoperative pain developed    72 inches tall 206 pounds up 14 pounds from last visit.  BMI 28 blood pressure best 124/88 pulse 60 respirations 18 O2 sats 96% on room air.  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    Assessment and Plan  General medical examination at health care facility.  For evaluation of evanescent abdominal pain and bilateral hip pain will check x-ray of the both hips and pelvis plus CT scan of abdomen and pelvis in addition to  the usual comprehensive labs as part of his physical exam including hemogram comprehensive metabolic profile urinalysis lipid panel PSA TSH.  Advise colonoscopy as well as he will be turning 51 this coming month with colorectal surgery group Dr. BRANNON or Dr. Horace Roger presiding at [phone].    The following high BMI interventions were performed this visit: encouragement to exercise    Neeraj Lindsey MD    Patient Active Problem List   Diagnosis     Sinus Bradycardia     Atypical Chest Pain     General medical exam     Vasovagal syncope     H/O multiple concussions     Cervical herniated disc

## 2021-06-15 NOTE — TELEPHONE ENCOUNTER
Called and relayed message to patient. He stated understanding. Was looking to see who his daughter could establish with here and I sent him a message of the female providers accepting new patients

## 2021-06-15 NOTE — TELEPHONE ENCOUNTER
Sorry to hear of pt's dtr dx of lupus and would recommend as a very good rheumattologist to eval and treat lupus Dr Man and please call pt for me

## 2021-06-19 NOTE — LETTER
Letter by Neeraj Lindsey MD at      Author: Neeraj Lindsey MD Service: -- Author Type: --    Filed:  Encounter Date: 10/25/2019 Status: Signed         Otoniel Cook  [address] [address]             October 25, 2019         Dear Mr. Cook,    Below are the results from your recent visit:    Resulted Orders   HM2(CBC w/o Differential)   Result Value Ref Range    WBC 5.7 4.0 - 11.0 thou/uL    RBC 5.35 4.40 - 6.20 mill/uL    Hemoglobin 16.7 14.0 - 18.0 g/dL    Hematocrit 49.3 40.0 - 54.0 %    MCV 92 80 - 100 fL    MCH 31.1 27.0 - 34.0 pg    MCHC 33.8 32.0 - 36.0 g/dL    RDW 12.4 11.0 - 14.5 %    Platelets 237 140 - 440 thou/uL    MPV 8.2 7.0 - 10.0 fL   Comprehensive Metabolic Panel   Result Value Ref Range    Sodium 141 136 - 145 mmol/L    Potassium 5.4 (H) 3.5 - 5.0 mmol/L    Chloride 105 98 - 107 mmol/L    CO2 27 22 - 31 mmol/L    Anion Gap, Calculation 9 5 - 18 mmol/L    Glucose 95 70 - 125 mg/dL    BUN 15 8 - 22 mg/dL    Creatinine 0.89 0.70 - 1.30 mg/dL    GFR MDRD Af Amer >60 >60 mL/min/1.73m2    GFR MDRD Non Af Amer >60 >60 mL/min/1.73m2    Bilirubin, Total 0.7 0.0 - 1.0 mg/dL    Calcium 10.1 8.5 - 10.5 mg/dL    Protein, Total 7.2 6.0 - 8.0 g/dL    Albumin 4.5 3.5 - 5.0 g/dL    Alkaline Phosphatase 75 45 - 120 U/L    AST 42 (H) 0 - 40 U/L    ALT 50 (H) 0 - 45 U/L    Narrative    Fasting Glucose reference range is 70-99 mg/dL per  American Diabetes Association (ADA) guidelines.   Lipid Cascade   Result Value Ref Range    Cholesterol 215 (H) <=199 mg/dL    Triglycerides 141 <=149 mg/dL    HDL Cholesterol 45 >=40 mg/dL    LDL Calculated 142 (H) <=129 mg/dL    Patient Fasting > 8hrs? Yes    Thyroid Stimulating Hormone (TSH)   Result Value Ref Range    TSH 1.39 0.30 - 5.00 uIU/mL   Urinalysis-UC if Indicated   Result Value Ref Range    Color, UA Yellow Colorless, Yellow, Straw, Light Yellow    Clarity, UA Clear Clear    Glucose, UA Negative Negative    Bilirubin, UA Negative  Negative    Ketones, UA Negative Negative    Specific Gravity, UA 1.020 1.005 - 1.030    Blood, UA Negative Negative    pH, UA 6.0 5.0 - 8.0    Protein, UA Negative Negative mg/dL    Urobilinogen, UA 0.2 E.U./dL 0.2 E.U./dL, 1.0 E.U./dL    Nitrite, UA Negative Negative    Leukocytes, UA Negative Negative    Narrative    Microscopic not indicated  UC not indicated   PSA (Prostatic-Specific Antigen), Annual Screen   Result Value Ref Range    PSA 1.1 0.0 - 2.5 ng/mL    Narrative    Method is Abbott Prostate-Specific Antigen (PSA)  Standard-WHO 1st International (90:10)       Oscar will follow low fat diet Myra Sawant,      All very good results  Stop any and all potassium supplements and ask pt to start rosuvastatin 5mg no 90 one daily and 3rf's and all other labs are quite good    Please call with questions or contact us using [email or web address].    Sincerely,        Electronically signed by Neeraj Lindsey MD

## 2021-06-20 NOTE — PROGRESS NOTES
Office Visit - Follow Up   Otoniel Cook   48 y.o. 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 Chest pain is not reproduced . It also is different from last episode is more dull and gnawing and pleuritic in nature . Flank pain is sharp on left side , woke him up at night at 3 am . Also feels sharp pain elsewhere in abdomen . NO other localising symptoms like cough , shortness of breath , change in bowel habits or dysuria or frequency . No acute low back pain or sciatica or thoracolumbar pain . Has been under ariana stress , works long hours  Doesn't get much sleep , stressed about his teenage daughters and also his fathers recent diagnosis . Of  stage 4 lung cancer . Father also had prostate cancer and leukemia    social smoker , one pack a week .          Assessment and Plan   1. Flank pain    - Urinalysis-UC if Indicated  - Lipid Cascade  - Glucose  - CT Chest High Resolution Without Contrast; Future    2. Atypical chest pain  Mild possible pleural rub heard on left upper quadrant , no creps or rales. Rest of exam completely normal . Focal neuro exam normal . I do feel pain is musculoskeletal versus pleurisy compounded by anxiety . However , he does have risk factors and because the lung exam is not completely normal will order CT scan . He is not sure but he may have an iodine allergy . Will do HRCT . EKG reviewed and is NSR no ischemic changes . Reassured patient . Given his history I do not think stress test is indicated .   The patient indicates understanding of these  issues and agrees with the plan.          No Follow-up on file.     History of Present Illness   This 48 y.o. old see abvoe     Review of Systems: A comprehensive review of systems was negative except as noted.     Medications, Allergies and Problem List   Reviewed, reconciled and updated     Physical Exam   General Appearance:   Very pleasant  Slightly anxious     /80  Pulse 85  Ht 6' (1.829 m)  Wt 183 lb (83 kg)  SpO2 97% Comment: RA  BMI 24.82 kg/m2    General appearance - alert, well appearing, and in no distress  Mental status - alert, oriented to person, place, and time  Neck - supple, no significant adenopathy  Lymphatics - no palpable lymphadenopathy, no hepatosplenomegaly  Chest - clear to auscultation, focal pleural rub left upper quadrant of lung no wheezes, rales or rhonchi, symmetric air entry  Heart - normal rate, regular rhythm, normal S1, S2, no murmurs, rubs, clicks or gallops  Abdomen - soft, nontender, nondistended, no masses or organomegaly   , normal tests , no hernias   Straight leg negative    full ROM neck , no pain reproduced on flexion   Normal strength of upper extremities , brisk symmetrical reflexes                                                                                        Additional Information   Current Outpatient Prescriptions   Medication Sig Dispense Refill     cyclobenzaprine (FLEXERIL) 5 MG tablet TAKE 1 TABLET BY MOUTH TWICE A DAY 20 tablet 0     gabapentin (NEURONTIN) 300 MG capsule Take 300 mg three times daily.  Increase dose by 300 mg every three days.  Max dose 900 mg tid. 270 capsule 2     ibuprofen (ADVIL,MOTRIN) 600 MG tablet TAKE 1 TABLET THREE TIMES A DAY. 270 tablet 0     Current Facility-Administered Medications   Medication Dose Route Frequency Provider Last Rate Last Dose     traZODone tablet 50 mg (DESYREL)  50 mg Oral Bedtime PRN Neeraj Lindsey MD         No Known Allergies  Social History   Substance Use Topics     Smoking status:  Light Tobacco Smoker     Packs/day: 0.50     Years: 20.00     Types: Cigarettes     Last attempt to quit: 12/28/2010     Smokeless tobacco: Never Used      Comment: one daily     Alcohol use 3.6 oz/week     6 Cans of beer per week      Comment: occasional       Time: not applicable     Koki Reyna MD

## 2021-06-21 NOTE — LETTER
Letter by Neeraj Lindsey MD at      Author: Neeraj Lindsey MD Service: -- Author Type: --    Filed:  Encounter Date: 2/26/2021 Status: (Other)         Otoniel Cook  [address] [facility] [address]             February 26, 2021         Dear Mr. Cook,    Below are the results from your recent visit:    Resulted Orders   HM2(CBC w/o Differential)   Result Value Ref Range    WBC 8.2 4.0 - 11.0 thou/uL    RBC 5.26 4.40 - 6.20 mill/uL    Hemoglobin 16.3 14.0 - 18.0 g/dL    Hematocrit 47.4 40.0 - 54.0 %    MCV 90 80 - 100 fL    MCH 31.0 27.0 - 34.0 pg    MCHC 34.4 32.0 - 36.0 g/dL    RDW 12.4 11.0 - 14.5 %    Platelets 223 140 - 440 thou/uL    MPV 10.2 (H) 7.0 - 10.0 fL   Comprehensive Metabolic Panel   Result Value Ref Range    Sodium 137 136 - 145 mmol/L    Potassium 4.3 3.5 - 5.0 mmol/L    Chloride 102 98 - 107 mmol/L    CO2 26 22 - 31 mmol/L    Anion Gap, Calculation 9 5 - 18 mmol/L    Glucose 89 70 - 125 mg/dL    BUN 13 8 - 22 mg/dL    Creatinine 0.86 0.70 - 1.30 mg/dL    GFR MDRD Af Amer >60 >60 mL/min/1.73m2    GFR MDRD Non Af Amer >60 >60 mL/min/1.73m2    Bilirubin, Total 0.7 0.0 - 1.0 mg/dL    Calcium 9.2 8.5 - 10.5 mg/dL    Protein, Total 7.0 6.0 - 8.0 g/dL    Albumin 4.3 3.5 - 5.0 g/dL    Alkaline Phosphatase 66 45 - 120 U/L    AST 56 (H) 0 - 40 U/L     (H) 0 - 45 U/L    Narrative    Fasting Glucose reference range is 70-99 mg/dL per  American Diabetes Association (ADA) guidelines.   Lipid Cascade   Result Value Ref Range    Cholesterol 240 (H) <=199 mg/dL    Triglycerides 269 (H) <=149 mg/dL    HDL Cholesterol 43 >=40 mg/dL    LDL Calculated 143 (H) <=129 mg/dL    Patient Fasting > 8hrs? Yes    Thyroid Stimulating Hormone (TSH)   Result Value Ref Range    TSH 3.71 0.30 - 5.00 uIU/mL   Urinalysis-UC if Indicated   Result Value Ref Range    Color, UA Yellow Colorless, Yellow, Straw, Light Yellow    Clarity, UA Clear Clear    Glucose, UA Negative Negative    Bilirubin, UA Negative  Negative    Ketones, UA Negative Negative    Specific Gravity, UA <=1.005 1.005 - 1.030    Blood, UA Negative Negative    pH, UA 6.0 5.0 - 8.0    Protein, UA Negative Negative mg/dL    Urobilinogen, UA 0.2 E.U./dL 0.2 E.U./dL, 1.0 E.U./dL    Nitrite, UA Negative Negative    Leukocytes, UA Negative Negative    Narrative    Microscopic not indicated  UC not indicated   PSA (Prostatic-Specific Antigen), Annual Screen   Result Value Ref Range    PSA 1.4 0.0 - 2.5 ng/mL    Narrative    Method is Abbott Prostate-Specific Antigen (PSA)  Standard-WHO 1st International (90:10)       Elevated liver function tests consistent with steatohepatitis.  Patient should lose weight by not drinking as much alcohol following a low calorie diet and increasing exercise.  Less alcohol and I did impaired.     Lipids are too high and patient needs rosuvastatin 5 mg tablets number 90 tablets take 1 tablet daily with 3 refills.     All other labs are quite good  Tried called x 2 days  Phone would not connect    Please call with questions or contact us using Er[employer].    Sincerely,        Electronically signed by Neeraj Lindsey MD

## 2021-06-23 NOTE — TELEPHONE ENCOUNTER
Medication Question or Clarification  Who is calling: Patient  What medication are you calling about?: Gabapentin    What dose do you take?:  300 mg  How often are you taking the medication?: 2 tablets , three times daily  Who prescribed the medication ?: Dr. Ward   What is your question/concern?:   RX called in 6/12/19 is incorrect an patient out of medication,  Please send new RX with correct dose & directions. Please call patient with any questions.   [facility]  Okay to leave a detailed message?: Yes  Site CMT - Please call the pharmacy to obtain any additional needed information.        gabapentin (NEURONTIN) 300 MG capsule 30 capsule 3 6/12/2019 6/11/2020 No   Sig - Route: TAKE 1 CAPSULE (300 MG TOTAL) BY MOUTH AT BEDTIME. - Oral   Sent to pharmacy as: gabapentin (NEURONTIN) 300 MG capsule   E-Prescribing Status: Receipt confirmed by pharmacy (6/12/2019  3:50 PM CDT)                        Patient given Testosterone 200mg IM left gluteal..  Will return in 2 weeks for next injection    Patient tolerated injection well.     Administrations This Visit     testosterone cypionate (DEPOTESTOTERONE CYPIONATE) injection 200 mg     Admin Date  06/23/2021 Action  Given Dose  200 mg Route  Intramuscular Administered By  Jeimy Qiu LPN

## 2021-07-01 ENCOUNTER — COMMUNICATION - HEALTHEAST (OUTPATIENT)
Dept: INTERNAL MEDICINE | Facility: CLINIC | Age: 51
End: 2021-07-01

## 2021-07-01 DIAGNOSIS — Z00.00 ROUTINE GENERAL MEDICAL EXAMINATION AT A HEALTH CARE FACILITY: ICD-10-CM

## 2021-07-02 RX ORDER — CYCLOBENZAPRINE HCL 5 MG
TABLET ORAL
Qty: 20 TABLET | Refills: 5 | Status: SHIPPED | OUTPATIENT
Start: 2021-07-02 | End: 2022-03-01

## 2021-07-04 NOTE — TELEPHONE ENCOUNTER
Telephone Encounter by Leti Desai, RN at 7/1/2021  6:28 PM     Author: Leti Desai RN Service: -- Author Type: Registered Nurse    Filed: 7/1/2021  6:28 PM Encounter Date: 7/1/2021 Status: Signed    : Leti Desai RN (Registered Nurse)       RN cannot approve Refill Request    RN can NOT refill this medication med is not covered by policy/route to provider. Last office visit: 9/25/2017 Neeraj Lindsey MD Last Physical: 2/23/2021 Last MTM visit: Visit date not found Last visit same specialty: 10/3/2018 Koki Reyna MD.  Next visit within 3 mo: Visit date not found  Next physical within 3 mo: Visit date not found      Leti Desai, [facility] Triage/Med Refill 7/1/2021    Requested Prescriptions   Pending Prescriptions Disp Refills   ? cyclobenzaprine (FLEXERIL) 5 MG tablet [Pharmacy Med Name: CYCLOBENZAPRINE 5 MG TABLET 5 Tablet] 20 tablet 5     Sig: TAKE 1 TABLET BY MOUTH TWICE A DAY       There is no refill protocol information for this order

## 2021-07-30 ENCOUNTER — TELEPHONE (OUTPATIENT)
Dept: INTERNAL MEDICINE | Facility: CLINIC | Age: 51
End: 2021-07-30

## 2021-07-30 DIAGNOSIS — I10 ESSENTIAL HYPERTENSION: Primary | ICD-10-CM

## 2021-07-30 RX ORDER — AZITHROMYCIN 250 MG/1
TABLET, FILM COATED ORAL
Qty: 6 TABLET | Refills: 0 | Status: SHIPPED | OUTPATIENT
Start: 2021-07-30 | End: 2021-08-04

## 2021-07-30 NOTE — TELEPHONE ENCOUNTER
Dr. Lindsey    Patient is wondering if you can send a zpak to his pharmacy.  He said that he has a sinus infection.  He has greenish mucus.  He has a sinus headache.  He said that he gets one every year.  He uses [facility] pharmacy in his chart.  He would like a call back at .

## 2021-09-25 ENCOUNTER — HEALTH MAINTENANCE LETTER (OUTPATIENT)
Age: 51
End: 2021-09-25

## 2021-11-08 ENCOUNTER — TELEPHONE (OUTPATIENT)
Dept: INTERNAL MEDICINE | Facility: CLINIC | Age: 51
End: 2021-11-08
Payer: COMMERCIAL

## 2021-11-08 NOTE — TELEPHONE ENCOUNTER
Patient is requesting call back from Dr. Lindsey.    Would like to discuss his lab results from Feb 2021

## 2021-11-10 NOTE — TELEPHONE ENCOUNTER
Patient returning call.  Message relayed.    Attempted to schedule phone appointment - patient pushed back does not understand why he can't just talk to Dr. Lindsey about his results without being charged for a visit.    Pt declined to schedule appt.

## 2021-11-10 NOTE — TELEPHONE ENCOUNTER
I called and left message on patient's answering machine yesterday.  Okay to bring through once I am  back in the office.

## 2021-12-23 DIAGNOSIS — M50.20 CERVICAL HERNIATED DISC: ICD-10-CM

## 2021-12-25 RX ORDER — GABAPENTIN 300 MG/1
CAPSULE ORAL
Qty: 270 CAPSULE | Refills: 11 | Status: SHIPPED | OUTPATIENT
Start: 2021-12-25 | End: 2023-09-05

## 2022-03-01 ENCOUNTER — OFFICE VISIT (OUTPATIENT)
Dept: INTERNAL MEDICINE | Facility: CLINIC | Age: 52
End: 2022-03-01
Payer: COMMERCIAL

## 2022-03-01 ENCOUNTER — TELEPHONE (OUTPATIENT)
Dept: INTERNAL MEDICINE | Facility: CLINIC | Age: 52
End: 2022-03-01

## 2022-03-01 VITALS
BODY MASS INDEX: 25.33 KG/M2 | WEIGHT: 187 LBS | DIASTOLIC BLOOD PRESSURE: 84 MMHG | HEIGHT: 72 IN | OXYGEN SATURATION: 97 % | HEART RATE: 82 BPM | SYSTOLIC BLOOD PRESSURE: 120 MMHG

## 2022-03-01 DIAGNOSIS — Z00.00 ROUTINE GENERAL MEDICAL EXAMINATION AT A HEALTH CARE FACILITY: Primary | ICD-10-CM

## 2022-03-01 LAB
ALBUMIN SERPL-MCNC: 4.7 G/DL (ref 3.5–5)
ALBUMIN UR-MCNC: ABNORMAL MG/DL
ALP SERPL-CCNC: 61 U/L (ref 45–120)
ALT SERPL W P-5'-P-CCNC: 29 U/L (ref 0–45)
ANION GAP SERPL CALCULATED.3IONS-SCNC: 10 MMOL/L (ref 5–18)
APPEARANCE UR: CLEAR
AST SERPL W P-5'-P-CCNC: 24 U/L (ref 0–40)
BACTERIA #/AREA URNS HPF: ABNORMAL /HPF
BILIRUB SERPL-MCNC: 0.5 MG/DL (ref 0–1)
BILIRUB UR QL STRIP: NEGATIVE
BUN SERPL-MCNC: 16 MG/DL (ref 8–22)
CALCIUM SERPL-MCNC: 10.3 MG/DL (ref 8.5–10.5)
CHLORIDE BLD-SCNC: 105 MMOL/L (ref 98–107)
CHOLEST SERPL-MCNC: 163 MG/DL
CO2 SERPL-SCNC: 26 MMOL/L (ref 22–31)
COLOR UR AUTO: YELLOW
CREAT SERPL-MCNC: 0.99 MG/DL (ref 0.7–1.3)
ERYTHROCYTE [DISTWIDTH] IN BLOOD BY AUTOMATED COUNT: 12.7 % (ref 10–15)
FASTING STATUS PATIENT QL REPORTED: YES
GFR SERPL CREATININE-BSD FRML MDRD: >90 ML/MIN/1.73M2
GLUCOSE BLD-MCNC: 86 MG/DL (ref 70–125)
GLUCOSE UR STRIP-MCNC: NEGATIVE MG/DL
HCT VFR BLD AUTO: 42 % (ref 40–53)
HDLC SERPL-MCNC: 48 MG/DL
HGB BLD-MCNC: 13.9 G/DL (ref 13.3–17.7)
HGB UR QL STRIP: NEGATIVE
HYALINE CASTS #/AREA URNS LPF: ABNORMAL /LPF
KETONES UR STRIP-MCNC: NEGATIVE MG/DL
LDLC SERPL CALC-MCNC: 88 MG/DL
LEUKOCYTE ESTERASE UR QL STRIP: NEGATIVE
MCH RBC QN AUTO: 30.5 PG (ref 26.5–33)
MCHC RBC AUTO-ENTMCNC: 33.1 G/DL (ref 31.5–36.5)
MCV RBC AUTO: 92 FL (ref 78–100)
NITRATE UR QL: NEGATIVE
PH UR STRIP: 7.5 [PH] (ref 5–8)
PLATELET # BLD AUTO: 227 10E3/UL (ref 150–450)
POTASSIUM BLD-SCNC: 5.8 MMOL/L (ref 3.5–5)
PROT SERPL-MCNC: 7.4 G/DL (ref 6–8)
PSA SERPL-MCNC: 1.16 UG/L (ref 0–3.5)
RBC # BLD AUTO: 4.55 10E6/UL (ref 4.4–5.9)
RBC #/AREA URNS AUTO: ABNORMAL /HPF
SODIUM SERPL-SCNC: 141 MMOL/L (ref 136–145)
SP GR UR STRIP: 1.02 (ref 1–1.03)
SQUAMOUS #/AREA URNS AUTO: ABNORMAL /LPF
TRIGL SERPL-MCNC: 137 MG/DL
TSH SERPL DL<=0.005 MIU/L-ACNC: 2.79 UIU/ML (ref 0.3–5)
UROBILINOGEN UR STRIP-ACNC: 0.2 E.U./DL
WBC # BLD AUTO: 7.4 10E3/UL (ref 4–11)
WBC #/AREA URNS AUTO: ABNORMAL /HPF

## 2022-03-01 PROCEDURE — 99396 PREV VISIT EST AGE 40-64: CPT | Performed by: INTERNAL MEDICINE

## 2022-03-01 PROCEDURE — 80061 LIPID PANEL: CPT | Performed by: INTERNAL MEDICINE

## 2022-03-01 PROCEDURE — 36415 COLL VENOUS BLD VENIPUNCTURE: CPT | Performed by: INTERNAL MEDICINE

## 2022-03-01 PROCEDURE — 80050 GENERAL HEALTH PANEL: CPT | Performed by: INTERNAL MEDICINE

## 2022-03-01 PROCEDURE — 81001 URINALYSIS AUTO W/SCOPE: CPT | Performed by: INTERNAL MEDICINE

## 2022-03-01 PROCEDURE — G0103 PSA SCREENING: HCPCS | Performed by: INTERNAL MEDICINE

## 2022-03-01 NOTE — TELEPHONE ENCOUNTER
Patient calling after reviewing his lab results from today via [employer].  Patient asking this afternoon if, based on his lab results, he is able to stop taking the statin that he's bene on for the last 6-8 months.  Please review and call patient to discuss.  He can be reached at [phone]

## 2022-03-01 NOTE — LETTER
March 1, 2022      Oscar Cook  [address] [facility] [address]        Dear ,    We are writing to inform you of your test results.    All very good but for mild elevation in serum potassium. Would stop any and all potassium supplement and recheck serum potassium level with office visit or phone visit in 1 month's time. All the other labs are quite good.    Resulted Orders   CBC with platelets   Result Value Ref Range    WBC Count 7.4 4.0 - 11.0 10e3/uL    RBC Count 4.55 4.40 - 5.90 10e6/uL    Hemoglobin 13.9 13.3 - 17.7 g/dL    Hematocrit 42.0 40.0 - 53.0 %    MCV 92 78 - 100 fL    MCH 30.5 26.5 - 33.0 pg    MCHC 33.1 31.5 - 36.5 g/dL    RDW 12.7 10.0 - 15.0 %    Platelet Count 227 150 - 450 10e3/uL   Comprehensive metabolic panel   Result Value Ref Range    Sodium 141 136 - 145 mmol/L    Potassium 5.8 (H) 3.5 - 5.0 mmol/L    Chloride 105 98 - 107 mmol/L    Carbon Dioxide (CO2) 26 22 - 31 mmol/L    Anion Gap 10 5 - 18 mmol/L    Urea Nitrogen 16 8 - 22 mg/dL    Creatinine 0.99 0.70 - 1.30 mg/dL    Calcium 10.3 8.5 - 10.5 mg/dL    Glucose 86 70 - 125 mg/dL    Alkaline Phosphatase 61 45 - 120 U/L    AST 24 0 - 40 U/L    ALT 29 0 - 45 U/L    Protein Total 7.4 6.0 - 8.0 g/dL    Albumin 4.7 3.5 - 5.0 g/dL    Bilirubin Total 0.5 0.0 - 1.0 mg/dL    GFR Estimate >90 >60 mL/min/1.73m2      Comment:      Effective December 21, 2021 eGFRcr in adults is calculated using the 2021 CKD-EPI creatinine equation which includes age and gender (Pete ya al., NEJM, DOI: 10.1056/RTULor6177889)   TSH   Result Value Ref Range    TSH 2.79 0.30 - 5.00 uIU/mL   Prostate Specific Antigen Screen   Result Value Ref Range    Prostate Specific Antigen Screen 1.16 0.00 - 3.50 ug/L    Narrative    Assay Method is Abbott Prostate-Specific Antigen (PSA)  Standard-WHO 1st International (90:10)   Lipid panel reflex to direct LDL Fasting   Result Value Ref Range    Cholesterol 163 <=199 mg/dL    Triglycerides 137 <=149 mg/dL    Direct  Measure HDL 48 >=40 mg/dL      Comment:      HDL Cholesterol Reference Range:     0-2 years:   No reference ranges established for patients under 2 years old  at [facility] for lipid analytes.    2-8 years:  Greater than 45 mg/dL     18 years and older:   Female: Greater than or equal to 50 mg/dL   Male:   Greater than or equal to 40 mg/dL    LDL Cholesterol Calculated 88 <=129 mg/dL    Patient Fasting > 8hrs? Yes    UA reflex to Microscopic and Culture   Result Value Ref Range    Color Urine Yellow Colorless, Straw, Light Yellow, Yellow    Appearance Urine Clear Clear    Glucose Urine Negative Negative mg/dL    Bilirubin Urine Negative Negative    Ketones Urine Negative Negative mg/dL    Specific Gravity Urine 1.020 1.005 - 1.030    Blood Urine Negative Negative    pH Urine 7.5 5.0 - 8.0    Protein Albumin Urine Trace (A) Negative mg/dL    Urobilinogen Urine 0.2 0.2, 1.0 E.U./dL    Nitrite Urine Negative Negative    Leukocyte Esterase Urine Negative Negative   Urine Microscopic   Result Value Ref Range    Bacteria Urine None Seen None Seen /HPF    RBC Urine 0-2 0-2 /HPF /HPF    WBC Urine None Seen 0-5 /HPF /HPF    Squamous Epithelials Urine None Seen None Seen /LPF    Hyaline Casts Urine 2-5 (A) None Seen /LPF    Narrative    Urine Culture not indicated       If you have any questions or concerns, please call the clinic at the number listed above.       Sincerely,      Neeraj Lindsey MD

## 2022-03-01 NOTE — PROGRESS NOTES
SUBJECTIVE:   CC: Otoniel Cook is an 51 year old male who presents for preventative health visit.       Patient has been advised of split billing requirements and indicates understanding: Yes  Healthy Habits:     Getting at least 3 servings of Calcium per day:  Yes    Bi-annual eye exam:  Yes    Dental care twice a year:  NO    Sleep apnea or symptoms of sleep apnea:  None    Diet:  Low salt, Low fat/cholesterol and Carbohydrate counting    Frequency of exercise:  4-5 days/week    Duration of exercise:  45-60 minutes    Taking medications regularly:  Yes    Medication side effects:  None    PHQ-2 Total Score: 0    Additional concerns today:  No      {Outside tests to abstract? :818068}    {additional problems to add (Optional):017038}    Today's PHQ-2 Score:   PHQ-2 (  [employer]) 3/1/2022   Q1: Little interest or pleasure in doing things 0   Q2: Feeling down, depressed or hopeless 0   PHQ-2 Score 0   Q1: Little interest or pleasure in doing things Not at all   Q2: Feeling down, depressed or hopeless Not at all   PHQ-2 Score 0       Abuse: Current or Past(Physical, Sexual or Emotional)- No  Do you feel safe in your environment? Yes    Have you ever done Advance Care Planning? (For example, a Health Directive, POLST, or a discussion with a medical provider or your loved ones about your wishes): Yes, patient states has an Advance Care Planning document and will bring a copy to the clinic.    Social History     Tobacco Use     Smoking status: Light Tobacco Smoker     Packs/day: 0.50     Years: 20.00     Pack years: 10.00     Types: Cigarettes     Last attempt to quit: 2010     Years since quittin.1     Smokeless tobacco: Never Used     Tobacco comment: 1/2 pack per week   Substance Use Topics     Alcohol use: Yes     Alcohol/week: 4.0 standard drinks     Types: 4 Cans of beer per week         Alcohol Use 3/1/2022   Prescreen: >3 drinks/day or >7 drinks/week? No   {add AUDIT responses (Optional) (A score  "of 7 for adult men is an indication of hazardous drinking; a score of 8 or more is an indication of an alcohol use disorder.  A score of 7 or more for adult women is an indication of hazardous drinking or an alchohol use disorder):833327}    Last PSA:   Prostate Specific Antigen Screen   Date Value Ref Range Status   02/23/2021 1.4 0.0 - 2.5 ng/mL Final       Reviewed orders with patient. Reviewed health maintenance and updated orders accordingly - { :305050::\"Yes\"}  {Chronicprobdata (optional):577697}    Reviewed and updated as needed this visit by clinical staff   Tobacco  Allergies  Meds              Reviewed and updated as needed this visit by Provider                 {HISTORY OPTIONS (Optional):340037}    Review of Systems  {MALE ROS (Optional):317028::\"CONSTITUTIONAL: NEGATIVE for fever, chills, change in weight\",\"INTEGUMENTARY/SKIN: NEGATIVE for worrisome rashes, moles or lesions\",\"EYES: NEGATIVE for vision changes or irritation\",\"ENT: NEGATIVE for ear, mouth and throat problems\",\"RESP: NEGATIVE for significant cough or SOB\",\"CV: NEGATIVE for chest pain, palpitations or peripheral edema\",\"GI: NEGATIVE for nausea, abdominal pain, heartburn, or change in bowel habits\",\" male: negative for dysuria, hematuria, decreased urinary stream, erectile dysfunction, urethral discharge\",\"MUSCULOSKELETAL: NEGATIVE for significant arthralgias or myalgia\",\"NEURO: NEGATIVE for weakness, dizziness or paresthesias\",\"PSYCHIATRIC: NEGATIVE for changes in mood or affect\"}    OBJECTIVE:   /84 (BP Location: Right arm, Patient Position: Sitting)   Pulse 82   Ht 1.829 m (6')   Wt 84.8 kg (187 lb)   SpO2 97%   BMI 25.36 kg/m      Physical Exam  {Exam Choices (Optional):722606}    {Diagnostic Test Results (Optional):838316::\"Diagnostic Test Results:\",\"Labs reviewed in Epic\"}    ASSESSMENT/PLAN:   {Diag Picklist:190446}    {Patient advised of split billing (Optional):512624}    COUNSELING:   {MALE COUNSELING " "MESSAGES:443750::\"Reviewed preventive health counseling, as reflected in patient instructions\"}    Estimated body mass index is 25.36 kg/m  as calculated from the following:    Height as of this encounter: 1.829 m (6').    Weight as of this encounter: 84.8 kg (187 lb).     {Weight Management Plan (ACO) Complete if BMI is abnormal-  Ages 18-64  BMI >24.9.  Age 65+ with BMI <23 or >30 (Optional):661685}    He reports that he has been smoking cigarettes. He has a 10.00 pack-year smoking history. He has never used smokeless tobacco.  Tobacco Cessation Action Plan:   {TOBACCO CESSATION ACTION PLAN:785180}      Counseling Resources:  ATP IV Guidelines  Pooled Cohorts Equation Calculator  FRAX Risk Assessment  ICSI Preventive Guidelines  Dietary Guidelines for Americans, 2010  USDA's MyPlate  ASA Prophylaxis  Lung CA Screening    Neeraj Lindsey MD  [facility]  "

## 2022-03-01 NOTE — PROGRESS NOTES
Office Visit - Physical    Otoniel Cook   51 year old male    Date of Visit: 3/1/2022    Chief Complaint   Patient presents with     Physical     fasting       Subjective: Physical examination for this 51-year-old male with recent colonoscopy with Dr. BRANNON [facility] allCLEAR but for 1 polyp.    2 daughters one sophomore at [employer] age 20 double major.  1 a vic in high school at age 16.  Both good students.  Light smoker still 6 beers per week less than previous.  No known drug allergies.  Prior neck surgery posterior approach Dr. Pastor Lambert for cervical disc disease.  Recent colonoscopy allCLEAR but for 1 polyp benign.    Mother and father both living father 82 [place] cancer tumor survivor mother breast cancer survivor 74.  Wife well works at [employer] also patient of this examiner.    ROS: A comprehensive review of systems was performed and was otherwise negative    Medications:   Prior to Admission medications    Medication Sig Start Date End Date Taking? Authorizing Provider   cyclobenzaprine (FLEXERIL) 5 MG tablet [CYCLOBENZAPRINE (FLEXERIL) 5 MG TABLET] TAKE 1 TABLET BY MOUTH TWICE A DAY 5/21/20  Yes Neeraj Lindsey MD   rosuvastatin (CRESTOR) 5 MG tablet [ROSUVASTATIN (CRESTOR) 5 MG TABLET] Take 1 tablet (5 mg total) by mouth at bedtime. 2/26/21  Yes Neeraj Lindsey MD   acetaminophen (TYLENOL) 650 MG CR tablet [ACETAMINOPHEN (TYLENOL) 650 MG CR TABLET] Take 650 mg by mouth every 8 (eight) hours as needed for pain.  Patient not taking: Reported on 3/1/2022 10/24/19   Provider, Historical   gabapentin (NEURONTIN) 300 MG capsule TAKE 2 CAPSULES (600 MG) BY MOUTH THREE TIMES DAILY.  Patient not taking: Reported on 3/1/2022 12/25/21   Neeraj Lindsey MD   ibuprofen (ADVIL,MOTRIN) 600 MG tablet [IBUPROFEN (ADVIL,MOTRIN) 600 MG TABLET] TAKE 1 TABLET THREE TIMES A DAY.  Patient not taking: Reported on 3/1/2022 1/24/21   Neeraj Lindsey MD        Allergies:No Known Allergies    Immunizations:   Immunization History   Administered Date(s) Administered     COVID-19,PF,Moderna 04/08/2021, 05/06/2021, 11/15/2021     Influenza Vaccine IM > 6 months Valent IIV4 (Alfuria,Fluzone) 09/12/2020     Td,adult,historic,unspecified 06/26/2006     Tdap (Adacel,Boostrix) 06/26/2006     Tetanus 04/14/2015       Health Maintenance: Colonoscopy recently done allCLEAR with Dr. BRANNON colorectal surgery group in addition COVID vaccine has been administered including the booster.  Other immunizations reviewed up-to-date.    Past Medical History: Prior history of cervical disc disease smoking habit advised patient to stop smoking    History of fatty liver patient concerned expressed the best way to treat this initially is weight loss which he has done 20+ pounds recently.    Past Surgical History: No anesthetic complications with any prior surgeries.  Prior cervical disc disease surgery posterior approach Dr. Pastor alcantar.    Family History: Both parents living father 82 [employer] tumor survivor mother 74 breast cancer survivor 2 daughters well wife well.  Both daughters good students one in college one in high school.  Wife works for [employer] pharmacy enjoys good L also patient of this examiner.    Social History:  by [place] enjoys his work exercises on a regular basis recently successful in losing 21 pounds in weight.    Exam skin negative lymph negative neuro negative psych normal good spirited not resting slightly anxious head eyes ears nose throat negative back straight no severe spine tenderness.  HEENT negative back straight no severe spine tenderness no carotid bruits thyromegaly no lymphadenopathy appreciated lymph bearing areas heart tones normal abdomen benign genital rectal exam negative no groin hernias small prostate on rectal exam rest the rectal exam negative good pulse motor in all 4 extremities neuromuscular tone is good.  Vital signs are  stable afebrile excellent blood pressure 120/84 pulse 82 and regular    Assessment and Plan  General medical examination at health care facility today check hemogram comprehensive metabolic profile urinalysis lipid panel PSA TSH.  COVID vaccines including booster have been administered and colonoscopy recently done allCLEAR but for 1 polyp benign.  Dr. BRANNON colorectal surgery group presiding.        Neeraj Lindsey MD    Patient Active Problem List   Diagnosis     Sinus Bradycardia     Atypical Chest Pain     General medical exam     Vasovagal syncope     H/O multiple concussions     Cervical herniated disc     Answers for HPI/ROS submitted by the patient on 3/1/2022  Frequency of exercise:: 4-5 days/week  Getting at least 3 servings of Calcium per day:: Yes  Diet:: Low salt, Low fat/cholesterol, Carbohydrate counting  Taking medications regularly:: Yes  Medication side effects:: None  Bi-annual eye exam:: Yes  Dental care twice a year:: NO  Sleep apnea or symptoms of sleep apnea:: None  Additional concerns today:: No  Duration of exercise:: 45-60 minutes

## 2022-03-02 NOTE — TELEPHONE ENCOUNTER
Please call patient primary.    Okay to stop statin.    Needs office visit fasting in 4 months time to see how the lipid status is after stopping the statin.    Following a diet of low saturated fat low cholesterol exercise and weight loss as he has been so successful of weight will be beneficial to his lipid status. Thanks for calling patient

## 2022-03-02 NOTE — TELEPHONE ENCOUNTER
Left detailed message to call back and schedule appt. [place] msg will also be sent.     If pt does call back, please assist in scheduling 4mo F/U (ensure pt is fasting as well)    Sam Christensen Jr., CMA on 3/2/2022 at 11:27 AM

## 2022-05-07 ENCOUNTER — HEALTH MAINTENANCE LETTER (OUTPATIENT)
Age: 52
End: 2022-05-07

## 2022-06-24 DIAGNOSIS — Z00.00 ROUTINE GENERAL MEDICAL EXAMINATION AT A HEALTH CARE FACILITY: ICD-10-CM

## 2022-06-24 RX ORDER — IBUPROFEN 600 MG/1
TABLET, FILM COATED ORAL
Qty: 90 TABLET | Refills: 11 | Status: SHIPPED | OUTPATIENT
Start: 2022-06-24 | End: 2023-07-12

## 2022-06-25 NOTE — TELEPHONE ENCOUNTER
"Last Written Prescription Date:  1/24/2021  Last Fill Quantity: 270,  # refills: 11   Last office visit provider:  3/1/2022     Requested Prescriptions   Pending Prescriptions Disp Refills     ibuprofen (ADVIL/MOTRIN) 600 MG tablet [Pharmacy Med Name: IBUPROFEN 600 MG  Tablet] 90 tablet 11     Sig: TAKE 1 TABLET THREE TIMES A DAY.       NSAID Medications Passed - 6/24/2022  2:32 PM        Passed - Blood pressure under 140/90 in past 12 months     BP Readings from Last 3 Encounters:   03/01/22 120/84   02/23/21 126/88   10/24/19 120/72                 Passed - Normal ALT on file in past 12 months     Recent Labs   Lab Test 03/01/22  0930   ALT 29             Passed - Normal AST on file in past 12 months     Recent Labs   Lab Test 03/01/22  0930   AST 24             Passed - Recent (12 mo) or future (30 days) visit within the authorizing provider's specialty     Patient has had an office visit with the authorizing provider or a provider within the authorizing providers department within the previous 12 mos or has a future within next 30 days. See \"Patient Info\" tab in inbasket, or \"Choose Columns\" in Meds & Orders section of the refill encounter.              Passed - Patient is age 6-64 years        Passed - Normal CBC on file in past 12 months     Recent Labs   Lab Test 03/01/22  0930   WBC 7.4   RBC 4.55   HGB 13.9   HCT 42.0                    Passed - Medication is active on med list        Passed - Normal serum creatinine on file in past 12 months     Recent Labs   Lab Test 03/01/22  0930   CR 0.99       Ok to refill medication if creatinine is low               Yulia Baez RN 06/24/22 10:02 PM  "

## 2022-08-17 NOTE — TELEPHONE ENCOUNTER
Medication Therapy Management (MTM) Encounter    ASSESSMENT:                            Medication Adherence/Access: No issues identified    UTI: continue antibiotic and monitor, still awaiting culture results. See PCP notes for additional recommendations.     Back/leg pain: continue acetaminophen and topical diclofenac and monitor. Prefer to avoid nsaid at this time due to infection.     Type 2 Diabetes: A1c not at goal <8%. Glucose has reduced with use of sliding scale. Recommend insulin with sugary bedtime snack instead of diet changes per patient preference. No other changes at this time while waiting for effect of GLP-1 dose increase and resolution of infection.     PLAN:                            Add 4 units of Humalog with sugary bedtime snack.     Follow-up: 6-8 weeks    SUBJECTIVE/OBJECTIVE:                          Nena Tang is a 62 year old female coming in for a follow-up visit. Today's visit is a co-visit with Chely Aguilar. Patient was accompanied by group home staff, Edinson JIN on phone. Today's visit is a follow-up MTM visit from 8/2/22     Reason for visit: medication recheck.    Allergies/ADRs: Reviewed in chart  Past Medical History: Reviewed in chart  Tobacco: She reports that she has never smoked. She has never used smokeless tobacco.  Alcohol: none    Medication Adherence/Access: no issues reported    UTI: UC visit yesterday, started on cephalexin and took 1 dose last night. She complains of acute low back pain. Nauseous in clinic today.  No fever reported. See PCP notes for additional details.     Back/leg pain: using acetaminophen as needed and diclofenac topical as needed which seems to help per RN but often complains of back and leg pain. Patient stating today these medications have not been working. Walking has been more difficult lately with lower extremity edema.     Type 2 Diabetes:  Currently taking Lantus 33 units twice daily, Humalog 8 units with meals +sliding scale 3u per  Spoke with patient and he is going to try and be seen at [facility] where he has been seen in the past. If not, he will try [facility]. He will let us know if he needs our help with anything.    Clementina Melvin, CMA     50mg/dL starting glucose 151 and max 23u. She has been remembering to take her Humalog with her at drop-in center. Trulicity 3 mg weekly (increased dose yesterday). Patient is experiencing possible side effects: nausea.   Blood sugar monitoring: Continuous Glucose Monitor. Ranges (patient reported)    Date FBG/ 2hours post Lunch/2hours post Dinner /2hours post Bedtime Total Humalog/day   8/17 158       8/16 263 /277 /200 144 39   8/15 169  /291 314    8/14 211 /181 /144 145    8/13 209 /176 /172 150    8/12 146 /155 /295 166    8/11 203  /113 155      Symptoms of low blood sugar? none  Symptoms of high blood sugar? none  Eye exam: up to date  Foot exam: up to date  Diet: continues to eat high sugar foods. Ice cream sandwiches at bedtime (10-11pm) with no insulin.   Exercise: Not discussed.  Aspirin: Taking 81mg daily.   Statin: Yes: Atorvastatin.   ACEi/ARB: Yes: Losartan.  Urine Albumin:   Lab Results   Component Value Date    UMALCR 9.40 07/27/2021      Lab Results   Component Value Date    A1C 8.0 06/21/2022    A1C 7.3 03/24/2022    A1C 8.1 09/25/2021    A1C 10.0 08/10/2021    A1C 9.5 07/27/2021    A1C 9.1 12/01/2020    A1C 9.7 09/15/2020    A1C 8.6 06/30/2020    A1C 6.2 12/03/2019    A1C 6.6 08/06/2019        Today's Vitals: LMP 01/06/2015 (Exact Date)    BP Readings from Last 1 Encounters:   08/17/22 138/80     Pulse Readings from Last 1 Encounters:   08/17/22 74     Wt Readings from Last 1 Encounters:   08/17/22 246 lb (111.6 kg)     Ht Readings from Last 1 Encounters:   07/20/22 5' (1.524 m)     Estimated body mass index is 48.04 kg/m  as calculated from the following:    Height as of 7/20/22: 5' (1.524 m).    Weight as of an earlier encounter on 8/17/22: 246 lb (111.6 kg).    Temp Readings from Last 1 Encounters:   08/17/22 98  F (36.7  C) (Temporal)      ----------------      I spent 40 minutes with this patient today. All changes were made via collaborative practice agreement with ART Baumann  CNP. A copy of the visit note was provided to the patient's provider(s).    The patient was given a summary of these recommendations. See Provider note/AVS from today.     Eugenia De Jesus, PharmD, BCACP        Medication Therapy Recommendations  Type 2 diabetes mellitus with mild nonproliferative retinopathy (H)    Current Medication: insulin lispro (HUMALOG KWIKPEN) 100 UNIT/ML (1 unit dial) KWIKPEN   Rationale: Frequency inappropriate - Dosage too low - Effectiveness   Recommendation: Increase Frequency   Status: Accepted per CPA

## 2022-12-01 ENCOUNTER — NURSE TRIAGE (OUTPATIENT)
Dept: NURSING | Facility: CLINIC | Age: 52
End: 2022-12-01

## 2022-12-01 DIAGNOSIS — R05.9 COUGH: Primary | ICD-10-CM

## 2022-12-01 RX ORDER — PREDNISONE 20 MG/1
20 TABLET ORAL 2 TIMES DAILY
Qty: 10 TABLET | Refills: 0 | Status: SHIPPED | OUTPATIENT
Start: 2022-12-01 | End: 2023-09-19

## 2022-12-01 RX ORDER — AZITHROMYCIN 250 MG/1
TABLET, FILM COATED ORAL
Qty: 6 TABLET | Refills: 0 | Status: SHIPPED | OUTPATIENT
Start: 2022-12-01 | End: 2022-12-06

## 2022-12-01 NOTE — TELEPHONE ENCOUNTER
Thinks he came down with RSV. Coughing hard and tight chest. Started last night. Some chest tightness and shortness of breath today.  He would like treatment over the phone. Triage results in [facility]. He declines that, says he can't do that. Please call him at:  [phone].  Bhumika Hill RN  [facility]      Reason for Disposition    Chest pain present when not coughing    Additional Information    Negative: Bluish (or gray) lips or face    Negative: SEVERE difficulty breathing (e.g., struggling for each breath, speaks in single words)    Negative: Rapid onset of cough and has hives    Negative: Coughing started suddenly after medicine, an allergic food or bee sting    Negative: Difficulty breathing after exposure to flames, smoke, or fumes    Negative: Sounds like a life-threatening emergency to the triager    Negative: Previous asthma attacks and this feels like asthma attack    Negative: Dry cough (non-productive; no sputum or minimal clear sputum) and within 14 days of COVID-19 Exposure    Negative: MODERATE difficulty breathing (e.g., speaks in phrases, SOB even at rest, pulse 100-120) and still present when not coughing    Protocols used: COUGH-A-OH

## 2022-12-01 NOTE — TELEPHONE ENCOUNTER
Neeraj Lindsey MD Lorence, Rebekah, RN 13 minutes ago (8:47 AM)     MB  Please call patient and pharmacy.  Patient needs Z-Negro with 1 refill for the next 5 days in addition he should take prednisone 20 mg tablets dispense number 10 tablets take 1 twice daily with the Z-Negro and with food.  1 refill on the prednisone as well.  Fluids rest Tylenol Robitussin-DM cough syrup over-the-counter is also advised.  Please call patient and pharmacy.  JEREMI RAMON if no better needs virtual phone visit.  And needs to be checked either at home or here in our laboratory for PCR RSV influenza and COVID-19.  Especially if no better in 5 days time

## 2022-12-01 NOTE — TELEPHONE ENCOUNTER
Contacted patient and reviewed message/plan from PCP below.  Scripts sent to [facility] pharmacy per patient preference.  He does have home covid tests and will test in the next day, to update care team with any worsening symptoms or positive covid test.

## 2022-12-06 DIAGNOSIS — J40 BRONCHITIS: Primary | ICD-10-CM

## 2022-12-06 RX ORDER — AZITHROMYCIN 250 MG/1
TABLET, FILM COATED ORAL
Qty: 6 TABLET | Refills: 0 | Status: SHIPPED | OUTPATIENT
Start: 2022-12-06 | End: 2023-05-17

## 2022-12-06 NOTE — TELEPHONE ENCOUNTER
FYI - Status Update    Who is Calling: patient    Update: Patient reports he is still not feeling well, wondering if Dr. Lindsey would like him to repeat Z-tho treatment.    Tested negative for covid on 12/1 via home test.  Patient does not have a fever, just cough.     Does caller want a call/response back: Yes     Could we send this information to you in Zazzy or would you prefer to receive a phone call?:   Patient would prefer a phone call   Okay to leave a detailed message?: Yes at Cell number on file:    Telephone Information:   Mobile [phone]

## 2022-12-06 NOTE — TELEPHONE ENCOUNTER
Neeraj Lindsey MD  You 2 hours ago (10:03 AM)     MB  Yes I would repeat the Z-Negro.  JEREMI PRASAD

## 2022-12-23 DIAGNOSIS — Z00.00 ROUTINE GENERAL MEDICAL EXAMINATION AT A HEALTH CARE FACILITY: ICD-10-CM

## 2022-12-24 NOTE — TELEPHONE ENCOUNTER
Routing refill request to provider for review/approval because:  Drug not on the [facility] Refill Protocol  Due to medication information not transferring due to [facility] please review the medication information prior to signing to ensure accuracy.    Last Written Prescription:        Last office visit provider:  3/1/22    Requested Prescriptions   Pending Prescriptions Disp Refills     cyclobenzaprine (FLEXERIL) 5 MG tablet [Pharmacy Med Name: CYCLOBENZAPRINE 5 MG TABLET 5 Tablet] 20 tablet 5     Sig: TAKE 1 TABLET BY MOUTH TWICE A DAY       There is no refill protocol information for this order          Loree Otto RN 12/24/22 1:10 PM

## 2022-12-25 RX ORDER — CYCLOBENZAPRINE HCL 5 MG
TABLET ORAL
Qty: 20 TABLET | Refills: 5 | Status: SHIPPED | OUTPATIENT
Start: 2022-12-25

## 2022-12-26 ENCOUNTER — HEALTH MAINTENANCE LETTER (OUTPATIENT)
Age: 52
End: 2022-12-26

## 2023-02-02 NOTE — PROGRESS NOTES
[facility] Discharge Summary  Patient Name: Otoniel Cook  Date: 12/12/2017  Referral Diagnosis: Cervical radiculitis   Referring provider: Neeraj Lindsey MD  Visit Diagnosis:   1. Cervical herniated disc  Tens unit   2. Cervicalgia  Tens unit       Goals:  Pt. will be independent with home exercise program in : 4 weeks  Pt. will improve posture : and demonstrate posture with minimal to no cuing;2 minutes;in sitting;in standing;in 4 weeks  Patient Turn Head: for driving;for watching traffic;for conversation;for computer;for work;with less pain;in 4 weeks  Patient will look up / down: for shaving;for reading;for other;with less pain;in 4 weeks      Patient was seen for 03 visits from 9-26-17 to 10-31-17 with 0 missed appointments.  The patient met goals and has demonstrated understanding of and independence in the home program for self-care, and progression to next steps.  He will initiate contact if questions or concerns arise.  The patient was instructed to follow up with physician's clinic.    Therapy will be discontinued at this time.  The patient will need a new referral to resume.    Thank you for your referral.  Christina Vidal PT  12/12/2017  1:11 PM   Hypercholesterolemia Monitoring: I explained this is common when taking isotretinoin. We will monitor closely.

## 2023-05-17 DIAGNOSIS — J40 BRONCHITIS: ICD-10-CM

## 2023-05-17 RX ORDER — AZITHROMYCIN 250 MG/1
TABLET, FILM COATED ORAL
Qty: 6 TABLET | Refills: 0 | Status: SHIPPED | OUTPATIENT
Start: 2023-05-17 | End: 2023-09-19

## 2023-06-02 ENCOUNTER — HEALTH MAINTENANCE LETTER (OUTPATIENT)
Age: 53
End: 2023-06-02

## 2023-07-11 DIAGNOSIS — Z00.00 ROUTINE GENERAL MEDICAL EXAMINATION AT A HEALTH CARE FACILITY: ICD-10-CM

## 2023-07-12 RX ORDER — IBUPROFEN 600 MG/1
TABLET, FILM COATED ORAL
Qty: 90 TABLET | Refills: 11 | Status: SHIPPED | OUTPATIENT
Start: 2023-07-12 | End: 2024-08-15

## 2023-07-12 NOTE — TELEPHONE ENCOUNTER
"Routing refill request to provider for review/approval because:  Labs not current:  AST, ALT, CBC, Creatinine  Patient needs to be seen because it has been more than 1 year since last office visit.  BP Failed    Last Written Prescription Date:  6/24/22  Last Fill Quantity: 90,  # refills: 11   Last office visit provider:  3/01/22 w/ Dr Lindsey     Requested Prescriptions   Pending Prescriptions Disp Refills     ibuprofen (ADVIL/MOTRIN) 600 MG tablet [Pharmacy Med Name: IBUPROFEN 600 MG  Tablet] 90 tablet 11     Sig: TAKE 1 TABLET THREE TIMES A DAY.       NSAID Medications Failed - 7/11/2023  3:55 PM        Failed - Blood pressure under 140/90 in past 12 months     BP Readings from Last 3 Encounters:   03/01/22 120/84   02/23/21 126/88   10/24/19 120/72                 Failed - Normal ALT on file in past 12 months     Recent Labs   Lab Test 03/01/22  0930   ALT 29             Failed - Normal AST on file in past 12 months     Recent Labs   Lab Test 03/01/22  0930   AST 24             Failed - Recent (12 mo) or future (30 days) visit within the authorizing provider's specialty     Patient has had an office visit with the authorizing provider or a provider within the authorizing providers department within the previous 12 mos or has a future within next 30 days. See \"Patient Info\" tab in inbasket, or \"Choose Columns\" in Meds & Orders section of the refill encounter.              Failed - Normal CBC on file in past 12 months     Recent Labs   Lab Test 03/01/22  0930   WBC 7.4   RBC 4.55   HGB 13.9   HCT 42.0                    Failed - Normal serum creatinine on file in past 12 months     Recent Labs   Lab Test 03/01/22  0930   CR 0.99       Ok to refill medication if creatinine is low          Passed - Patient is age 6-64 years        Passed - Medication is active on med list             Alison Drummond RN 07/12/23 11:16 AM  "

## 2023-09-05 ENCOUNTER — HOSPITAL ENCOUNTER (OUTPATIENT)
Dept: CT IMAGING | Facility: HOSPITAL | Age: 53
Discharge: HOME | End: 2023-09-05
Attending: FAMILY MEDICINE | Admitting: FAMILY MEDICINE
Payer: COMMERCIAL

## 2023-09-05 ENCOUNTER — OFFICE VISIT (OUTPATIENT)
Dept: FAMILY MEDICINE | Facility: CLINIC | Age: 53
End: 2023-09-05
Payer: COMMERCIAL

## 2023-09-05 VITALS
TEMPERATURE: 98 F | RESPIRATION RATE: 20 BRPM | SYSTOLIC BLOOD PRESSURE: 168 MMHG | HEART RATE: 71 BPM | BODY MASS INDEX: 28.85 KG/M2 | OXYGEN SATURATION: 95 % | WEIGHT: 212.7 LBS | DIASTOLIC BLOOD PRESSURE: 98 MMHG

## 2023-09-05 DIAGNOSIS — R51.9 ACUTE INTRACTABLE HEADACHE, UNSPECIFIED HEADACHE TYPE: Primary | ICD-10-CM

## 2023-09-05 DIAGNOSIS — R51.9 ACUTE INTRACTABLE HEADACHE, UNSPECIFIED HEADACHE TYPE: ICD-10-CM

## 2023-09-05 DIAGNOSIS — R03.0 ELEVATED BLOOD PRESSURE READING WITHOUT DIAGNOSIS OF HYPERTENSION: ICD-10-CM

## 2023-09-05 PROCEDURE — 70450 CT HEAD/BRAIN W/O DYE: CPT

## 2023-09-05 PROCEDURE — 99213 OFFICE O/P EST LOW 20 MIN: CPT | Mod: 25 | Performed by: FAMILY MEDICINE

## 2023-09-05 PROCEDURE — 96372 THER/PROPH/DIAG INJ SC/IM: CPT | Performed by: FAMILY MEDICINE

## 2023-09-05 RX ORDER — KETOROLAC TROMETHAMINE 30 MG/ML
30 INJECTION, SOLUTION INTRAMUSCULAR; INTRAVENOUS ONCE
Status: COMPLETED | OUTPATIENT
Start: 2023-09-05 | End: 2023-09-05

## 2023-09-05 RX ADMIN — KETOROLAC TROMETHAMINE 30 MG: 30 INJECTION, SOLUTION INTRAMUSCULAR; INTRAVENOUS at 11:45

## 2023-09-05 NOTE — PATIENT INSTRUCTIONS
I have put in a referral to neurology for you for further evaluation of your headaches.  Someone to be contacting you to set up an appointment.    Go to the emergency room if developing frequently worsening headache, vomiting, or other concerning symptoms.    Sinew to monitor your blood pressure at home.  If this continues to remain elevated follow-up with your primary doctor.
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
## 2023-09-19 ENCOUNTER — OFFICE VISIT (OUTPATIENT)
Dept: INTERNAL MEDICINE | Facility: CLINIC | Age: 53
End: 2023-09-19
Payer: COMMERCIAL

## 2023-09-19 ENCOUNTER — MYC MEDICAL ADVICE (OUTPATIENT)
Dept: INTERNAL MEDICINE | Facility: CLINIC | Age: 53
End: 2023-09-19

## 2023-09-19 VITALS
OXYGEN SATURATION: 95 % | DIASTOLIC BLOOD PRESSURE: 106 MMHG | SYSTOLIC BLOOD PRESSURE: 140 MMHG | TEMPERATURE: 97.8 F | BODY MASS INDEX: 28.77 KG/M2 | HEIGHT: 72 IN | RESPIRATION RATE: 22 BRPM | WEIGHT: 212.4 LBS | HEART RATE: 68 BPM

## 2023-09-19 DIAGNOSIS — R51.9 INTRACTABLE HEADACHE, UNSPECIFIED CHRONICITY PATTERN, UNSPECIFIED HEADACHE TYPE: Primary | ICD-10-CM

## 2023-09-19 LAB
ERYTHROCYTE [SEDIMENTATION RATE] IN BLOOD BY WESTERGREN METHOD: 9 MM/HR (ref 0–20)
HOLD SPECIMEN: NORMAL

## 2023-09-19 PROCEDURE — 99213 OFFICE O/P EST LOW 20 MIN: CPT | Performed by: INTERNAL MEDICINE

## 2023-09-19 PROCEDURE — 85652 RBC SED RATE AUTOMATED: CPT | Performed by: INTERNAL MEDICINE

## 2023-09-19 PROCEDURE — 36415 COLL VENOUS BLD VENIPUNCTURE: CPT | Performed by: INTERNAL MEDICINE

## 2023-09-19 ASSESSMENT — PATIENT HEALTH QUESTIONNAIRE - PHQ9
SUM OF ALL RESPONSES TO PHQ QUESTIONS 1-9: 24
10. IF YOU CHECKED OFF ANY PROBLEMS, HOW DIFFICULT HAVE THESE PROBLEMS MADE IT FOR YOU TO DO YOUR WORK, TAKE CARE OF THINGS AT HOME, OR GET ALONG WITH OTHER PEOPLE: EXTREMELY DIFFICULT
SUM OF ALL RESPONSES TO PHQ QUESTIONS 1-9: 24

## 2023-09-19 ASSESSMENT — PAIN SCALES - GENERAL: PAINLEVEL: MILD PAIN (3)

## 2023-09-19 NOTE — PROGRESS NOTES
{PROVIDER CHARTING PREFERENCE:183071}    Subjective   Oscar is a 53 year old, presenting for the following health issues:  ER F/U ([facility] follow up 09/05)        9/19/2023     8:13 AM   Additional Questions   Roomed by Cheo WATTERS   Accompanied by N/A           ED/UC Followup:    Facility:  [facility]   Date of visit: 09/05/2023  Reason for visit: Acute intractable headache, unspecified headache   Current Status: Pt states that his headache is still very much present.   {additonal problems for provider to add (Optional):544615}      Review of Systems   {ROS COMP (Optional):495464}      Objective    BP (!) 140/106 (BP Location: Right arm, Patient Position: Sitting, Cuff Size: Adult Large)   Pulse 68   Temp 97.8  F (36.6  C) (Oral)   Resp 22   Ht 1.829 m (6')   Wt 96.3 kg (212 lb 6.4 oz)   SpO2 95%   BMI 28.81 kg/m    Body mass index is 28.81 kg/m .  Physical Exam   {Exam List (Optional):314494}    {Diagnostic Test Results (Optional):509875}    {AMBULATORY ATTESTATION (Optional):006326}            Answers submitted by the patient for this visit:  Patient Health Questionnaire (Submitted on 9/19/2023)  If you checked off any problems, how difficult have these problems made it for you to do your work, take care of things at home, or get along with other people?: Extremely difficult  PHQ9 TOTAL SCORE: 24

## 2023-09-19 NOTE — PROGRESS NOTES
Assessment/Plan:    InTractable headaches.  Consider cluster headaches versus cranial arteritis trigeminal neuralgia also should be considered check sedimentation rate today.  Some change in vision and some radiation to the jaw and burning dysesthesia right cheek.  No associated rash of herpes zoster.  Present for at least 2 weeks intractable unable to sleep tearful at night.  CT head negative recently sedimentation rate pending along with neurologic consultation.  Will discuss in a few days.  May require treatment with either Tegretol prednisone or opioid narcotic.  Intolerant of gabapentin.  Consider Lyrica.    25 minutes spent on the date of the encounter doing chart review, patient visit, and documentation     Subjective:  Otoniel Cook is a 53 year old male presents for the following health issues headache severe 2 to 3 weeks duration over the right eye comes and goes cannot function when he has it.  CT head negative sedimentation rate pending pain radiates to the right jaw right face maxillary area no associated rash there is some burning dysesthesia.  Unable to concentrate burning sensation.  Consider cluster headaches cranial arteritis versus trigeminal neuralgia.  Intolerant of gabapentin made him too sleepy has had a history of cervical disc disease with postoperative pain after scheduled surgery.  Years ago.  We had a good discussion.    ROS:  No blood in stool or urine no chest pain shortness of breath feels nauseated as well medication list reviewed reconciled in the chart.    Objective:  BP (!) 140/106 (BP Location: Right arm, Patient Position: Sitting, Cuff Size: Adult Large)   Pulse 68   Temp 97.8  F (36.6  C) (Oral)   Resp 22   Ht 1.829 m (6')   Wt 96.3 kg (212 lb 6.4 oz)   SpO2 95%   BMI 28.81 kg/m    Examination was done.  Although vital signs were noted blood pressure 140/106 not resting hypertension may be also playing a role here I asked patient to have a phone visit with me in 6 days  time.  On Monday, September 25, 2023.  We had a good discussion.  He was afebrile and his pulse was only 68 O2 sats and respirations normal.    Neeraj Lindsey MD  Internal Medicine  Answers submitted by the patient for this visit:  Patient Health Questionnaire (Submitted on 9/19/2023)  If you checked off any problems, how difficult have these problems made it for you to do your work, take care of things at home, or get along with other people?: Extremely difficult  PHQ9 TOTAL SCORE: 24

## 2023-09-21 ENCOUNTER — OFFICE VISIT (OUTPATIENT)
Dept: NEUROLOGY | Facility: CLINIC | Age: 53
End: 2023-09-21
Attending: INTERNAL MEDICINE
Payer: COMMERCIAL

## 2023-09-21 VITALS
DIASTOLIC BLOOD PRESSURE: 92 MMHG | WEIGHT: 212 LBS | BODY MASS INDEX: 28.71 KG/M2 | HEART RATE: 81 BPM | HEIGHT: 72 IN | SYSTOLIC BLOOD PRESSURE: 138 MMHG

## 2023-09-21 DIAGNOSIS — R51.9 ACUTE INTRACTABLE HEADACHE, UNSPECIFIED HEADACHE TYPE: ICD-10-CM

## 2023-09-21 DIAGNOSIS — R51.9 INTRACTABLE HEADACHE, UNSPECIFIED CHRONICITY PATTERN, UNSPECIFIED HEADACHE TYPE: ICD-10-CM

## 2023-09-21 PROCEDURE — 99204 OFFICE O/P NEW MOD 45 MIN: CPT

## 2023-09-21 RX ORDER — INDOMETHACIN 50 MG/1
50 CAPSULE ORAL
Qty: 90 CAPSULE | Refills: 4 | Status: SHIPPED | OUTPATIENT
Start: 2023-09-21 | End: 2024-05-30
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
## 2023-09-21 NOTE — PATIENT INSTRUCTIONS
Plan:  --- Continue Preventive therapy: Indomethacin 50 mg tablet-take 1 capsule by mouth 3 times daily  --- Plan on follow up in the Neurology Clinic in 3 months.  --- Please feel free to reach out if you have any further questions or concerns.  --- Seek immediate medical attention if an emergency arises or if your health becomes progressively worse.     It was a pleasure meeting you today!

## 2023-09-21 NOTE — NURSING NOTE
Chief Complaint   Patient presents with    Headache     Patient reports severe headaches .     Sharri Frost on 9/21/2023 at 1:31 PM
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
## 2023-09-25 ENCOUNTER — VIRTUAL VISIT (OUTPATIENT)
Dept: INTERNAL MEDICINE | Facility: CLINIC | Age: 53
End: 2023-09-25
Payer: COMMERCIAL

## 2023-09-25 DIAGNOSIS — G44.011 INTRACTABLE EPISODIC CLUSTER HEADACHE: Primary | ICD-10-CM

## 2023-09-25 PROCEDURE — 99213 OFFICE O/P EST LOW 20 MIN: CPT | Mod: 93 | Performed by: INTERNAL MEDICINE

## 2023-09-25 RX ORDER — TRAMADOL HYDROCHLORIDE 50 MG/1
50 TABLET ORAL EVERY 6 HOURS PRN
Qty: 60 TABLET | Refills: 0 | Status: SHIPPED | OUTPATIENT
Start: 2023-09-25 | End: 2023-10-19

## 2023-09-25 NOTE — PROGRESS NOTES
Otoniel Cook is a 53 year oldwho is being evaluated via a billable telephone visit.       What phone number would you like to be contacted at? [phone]      Assessment & Plan:     cluster headaches probable suggest arthritis strength Tylenol 2 tablets 3 times a day plus tramadol 50 mg 3 times a day cautioned regarding opioid usage.  The patient has been seen by neurologist.  Working diagnosis for this examiner is cluster headaches rule out trigeminal neuralgia.  There may be a contribution to his headaches from mildly elevated blood pressure.  Last recorded 138/92 discussed.  No evidence for cranial arteritis latest sedimentation rate normal.  Rule out tension headaches which could have this similar pattern of intense severe pain although patient does note excessive sneezing tearing and allodynia.  11 minutes spent on the date of the encounter doing chart review, patient visit and documentation and I spoke with the patient directly on the phone 5 minutes.     Subjective  Severe incapacitating intractable headaches.  Associated symptoms including excessive rhinitis lacrimation and allodynia.  One-sided.  For this male patient age 53 may be a sign of cluster headaches.  Normal sedimentation rate noted rule out tension headaches versus trigeminal neuralgia.  MRI scan has been ordered by neurologic staff who is seeing the patient.  Sedimentation rate was normal.     Review of Systems   No blood in stool or urine medication list reviewed reconciled in the chart denies chest pain shortness of breath there is no rash associated no signs or symptoms of nuchal rigidity  or fever.  We had a good discussion.       Neeraj Lindsey MD

## 2023-09-26 ENCOUNTER — TELEPHONE (OUTPATIENT)
Dept: NEUROLOGY | Facility: CLINIC | Age: 53
End: 2023-09-26

## 2023-09-26 NOTE — TELEPHONE ENCOUNTER
It is not ok to use a 30 minute slot for a new patient. I understand that he wishes to be seen asap, but it will delay all patients thereafter from being seen on time.

## 2023-09-26 NOTE — TELEPHONE ENCOUNTER
This Pt has a bad headache that is not going away. He wants to get in to be seen asap. The first NEW appt I could find on Oct 20 involved using a 30 min ramp up slot. Please let writer know if this is NOT ok with Provider. Pt is wait listed for an earlier appt.

## 2023-09-27 NOTE — TELEPHONE ENCOUNTER
If they have already been seen by a [facility] provider I can see them for a 30 minute procedure visit. Procedure visits are 30 minutes with me, but they should always have first either seen me or another [facility] provider and referred by myself or them prior to scheduling the procedure visit.    The clinic visit with that patient is the variable. If they were seen by a [facility] provider and want to follow with me for all their care (med scripts other than botox, etc), I would prefer to have a 60 minute new visit at some point unless there is policy that this is not allowed. Usually the expectation is that the patients follow with their referring provider for all their care other than botox or other procedure.     Please let me know if that doesn't make sense or you still have questions and thank you for asking!  We can use ramp up time for that patient.

## 2023-10-04 ENCOUNTER — MYC MEDICAL ADVICE (OUTPATIENT)
Dept: NEUROLOGY | Facility: CLINIC | Age: 53
End: 2023-10-04
Payer: COMMERCIAL

## 2023-10-04 DIAGNOSIS — R51.9 INTRACTABLE HEADACHE, UNSPECIFIED CHRONICITY PATTERN, UNSPECIFIED HEADACHE TYPE: Primary | ICD-10-CM

## 2023-10-05 RX ORDER — RIZATRIPTAN BENZOATE 5 MG/1
5 TABLET, ORALLY DISINTEGRATING ORAL
Qty: 18 TABLET | Refills: 3 | Status: SHIPPED | OUTPATIENT
Start: 2023-10-05 | End: 2024-06-19

## 2023-10-05 RX ORDER — PREDNISONE 20 MG/1
20 TABLET ORAL DAILY
Qty: 5 TABLET | Refills: 0 | Status: SHIPPED | OUTPATIENT
Start: 2023-10-05

## 2023-10-05 NOTE — TELEPHONE ENCOUNTER
Stop Indomethacin since this has not been effective in treating his pain. Let patient know that I ordered a steroid burst as discussed at his last appointment. I also ordered Rizatriptan to take at the start of his pain. I will review his imaging ( MRI/MRA) once completed.     Thank you,   OSCAR Basurto CNP

## 2023-10-09 ENCOUNTER — MYC MEDICAL ADVICE (OUTPATIENT)
Dept: INTERNAL MEDICINE | Facility: CLINIC | Age: 53
End: 2023-10-09
Payer: COMMERCIAL

## 2023-10-09 DIAGNOSIS — G44.011 INTRACTABLE EPISODIC CLUSTER HEADACHE: Primary | ICD-10-CM

## 2023-10-11 NOTE — TELEPHONE ENCOUNTER
Reason for Call:  Other FMLA Forms    Detailed comments: Calling to confirm that FMLA paperwork has been received.    Confirmed with patient that [employer] messages have been received and are with Dr. Lindsey.    Patient expresses concern that the deadline to return paperwork is  tomorrow, 10/12/23    Last Visit with PCP = 09/25/2023  Upcoming Appointment = NEW with Dr. Carmen Quintero Neurology.     Phone Number Patient can be reached at: Cell number on file:    Telephone Information:   Mobile [phone]       Best Time: any    Can we leave a detailed message on this number? YES    Call taken on 10/11/2023 at 9:21 AM by Jenna Gimenez

## 2023-10-13 NOTE — TELEPHONE ENCOUNTER
Forms printed and placed in PCP's inbox for review when PCP is back on office Monday.     [employer] message sent to pt.

## 2023-10-13 NOTE — TELEPHONE ENCOUNTER
in view of the chart/ notes, I only see the treatment plan, with no mention of Leave or off work recommendations.  however, this may be overlooked as the patient has certainly had rough moments when ill.  please call Dr. Moser and inform of the acuity of the matter.

## 2023-10-16 ENCOUNTER — TELEPHONE (OUTPATIENT)
Dept: INTERNAL MEDICINE | Facility: CLINIC | Age: 53
End: 2023-10-16
Payer: COMMERCIAL

## 2023-10-16 NOTE — TELEPHONE ENCOUNTER
Called and spoke to patient and informed the patient that the forms were faxed to the number provided.    Thank you   LORIN Pavon

## 2023-10-18 DIAGNOSIS — G44.011 INTRACTABLE EPISODIC CLUSTER HEADACHE: Primary | ICD-10-CM

## 2023-10-19 RX ORDER — TRAMADOL HYDROCHLORIDE 50 MG/1
50 TABLET ORAL EVERY 6 HOURS PRN
Qty: 60 TABLET | Refills: 0 | Status: SHIPPED | OUTPATIENT
Start: 2023-10-19 | End: 2023-10-22

## 2023-10-31 ENCOUNTER — MYC MEDICAL ADVICE (OUTPATIENT)
Dept: INTERNAL MEDICINE | Facility: CLINIC | Age: 53
End: 2023-10-31
Payer: COMMERCIAL

## 2023-10-31 DIAGNOSIS — G44.011 INTRACTABLE EPISODIC CLUSTER HEADACHE: Primary | ICD-10-CM

## 2023-10-31 RX ORDER — GABAPENTIN 100 MG/1
100 CAPSULE ORAL 3 TIMES DAILY
Qty: 30 CAPSULE | Refills: 11 | Status: SHIPPED | OUTPATIENT
Start: 2023-10-31

## 2023-11-06 RX ORDER — TRAMADOL HYDROCHLORIDE 50 MG/1
50 TABLET ORAL EVERY 6 HOURS PRN
Qty: 60 TABLET | Refills: 0 | Status: SHIPPED | OUTPATIENT
Start: 2023-11-06 | End: 2024-01-29

## 2023-11-06 NOTE — TELEPHONE ENCOUNTER
Return to work form placed in PCP's in-basket for completion.    Patient has phone visit scheduled 11/09/2023

## 2023-11-08 NOTE — TELEPHONE ENCOUNTER
[facility] Call Center    Phone Message    May a detailed message be left on voicemail: yes     Reason for Call:     Patient called to schedule an appointment with Roxana Reyes for a sooner appointment for his headaches, please review patients request. Writer unsure, is patient switching to see  for on going headache/ migraines care? This is not made clear in message. Please review and assist. Patient is informed he is on a wait list for a sooner appointment with .          Action Taken: Other: [facility]    Travel Screening: Not Applicable

## 2023-11-08 NOTE — TELEPHONE ENCOUNTER
LM offering pt 11am 30 min follow up appt today 11/8 with Roxana Reyes NP as her clinic had a cancellation.  Did inform that I cannot hold appt, he will need to call back to see if still available.     No other openings until he is scheduled in January currently, he is on wait list for sooner appt if another one becomes available if not able to attend today.     Devyn Garzon RN, BSN  [facility]

## 2023-11-09 ENCOUNTER — VIRTUAL VISIT (OUTPATIENT)
Dept: INTERNAL MEDICINE | Facility: CLINIC | Age: 53
End: 2023-11-09
Payer: COMMERCIAL

## 2023-11-09 DIAGNOSIS — G44.009 CLUSTER HEADACHE, NOT INTRACTABLE, UNSPECIFIED CHRONICITY PATTERN: Primary | ICD-10-CM

## 2023-11-09 PROCEDURE — 99213 OFFICE O/P EST LOW 20 MIN: CPT | Mod: 93 | Performed by: INTERNAL MEDICINE

## 2023-11-09 RX ORDER — SUMATRIPTAN 50 MG/1
100 TABLET, FILM COATED ORAL
Qty: 20 TABLET | Refills: 3 | Status: SHIPPED | OUTPATIENT
Start: 2023-11-09

## 2023-11-09 NOTE — PROGRESS NOTES
Otoniel Cook is a 53 year oldwho is being evaluated via a billable telephone visit.       What phone number would you like to be contacted at? [phone]      Assessment & Plan  Cluster headaches.  Discussed the potential treatment program to include first sumatriptan and then Maxalt then verapamil then possibly Tegretol.  Previously has been intolerant of gabapentin.  Discouraged use of opioids.  Concerned regarding his job and disability form completed today for total disability on a temporary basis extending from the onset of these severe cluster intractable headaches August 29, 2023 through December 8, 2023 is times he is unable to work.  We had a good discussion.  Upcoming neurologic consultation is pending January 2023.  11 minutes spent on the date of the encounter doing chart review, patient visit and documentation and I spoke with the patient directly on the phone 11 minutes.  We had a good discussion.       Subjective  Severe one-sided headaches associated with rhinitis and watery eyes as well as allodynia started August 29, 2023.  Headaches are intractable and totally disabling he is unable to concentrate or work he has to seek the quiet of a dark room.  He also is nauseated and the headaches affect his appetite.     Review of Systems   No blood in stool or urine denies chest pain shortness of breath medication list reviewed reconciled in the chart.  Intolerant to gabapentin.  Previously used tramadol.       Neeraj Lindsey MD

## 2023-11-09 NOTE — TELEPHONE ENCOUNTER
Forms have been faxed. Copies made for scanning and hold bin.       Sam Christensen Jr., CMA on 11/9/2023 at 9:14 AM

## 2023-11-24 ENCOUNTER — MYC MEDICAL ADVICE (OUTPATIENT)
Dept: INTERNAL MEDICINE | Facility: CLINIC | Age: 53
End: 2023-11-24
Payer: COMMERCIAL

## 2023-11-27 NOTE — TELEPHONE ENCOUNTER
Forms are printed and placed on Cheo NAILS's desk until appt on 12/04/2023      Sam Christensen Jr., MICHEL on 11/27/2023 at 10:18 AM

## 2023-12-04 ENCOUNTER — VIRTUAL VISIT (OUTPATIENT)
Dept: INTERNAL MEDICINE | Facility: CLINIC | Age: 53
End: 2023-12-04
Payer: COMMERCIAL

## 2023-12-04 DIAGNOSIS — G44.011 INTRACTABLE EPISODIC CLUSTER HEADACHE: Primary | ICD-10-CM

## 2023-12-04 PROCEDURE — 99213 OFFICE O/P EST LOW 20 MIN: CPT | Mod: 93 | Performed by: INTERNAL MEDICINE

## 2023-12-04 NOTE — PROGRESS NOTES
Otoniel Cook is a 53 year oldwho is being evaluated via a billable telephone visit.       What phone number would you like to be contacted at? [phone]      Assessment & Plan  inTractable cluster headaches off work from September 4, 2023.  First visit with me September 19, 2023 condition commenced August 31, 2023 return to work date uncertain undetermined.  Treated with sumatriptan and Maxalt tramadol tramadol rare.  Occasional dizziness limits driving.  Has had a negative CT scan of the head and a normal sedimentation rate in September 2020 23 part of the evaluation has been seen by neurology and anticipates seeing a headache neurology specialist in early January 2024.  Extensive forms were completed for disability.  [employer].  Toll-free fax [phone].  [employer] [address] [address], [place] [address].  25 minutes spent on the date of the encounter doing chart review, patient visit and documentation        Subjective  Intractable cluster headaches.  For this 53-year-old male.  Out of work undetermined date of return to work.  See above     Review of Systems   Some benefit with Maxalt for headaches also uses a rare tramadol has been cautioned regarding excessive use of opioids for this condition previously tried sumatriptan hand and over-the-counter medications like ibuprofen arthritis strength acetaminophen.  Also ended Methasone was tried not beneficial.  Previous neurologic consultation has been done patient anticipates seeing a headache neurology specialist early January 2024 sedimentation rate normal CT head normal.  MRI scan is planned for early January 2024.  We had a good discussion.  Extensive forms completed.  Faxed on to Steven see above       Neeraj Lindsey MD

## 2023-12-04 NOTE — TELEPHONE ENCOUNTER
Forms have signed and completed.       Faxed forms out. Copies placed in Hold Bin and scanning.       Sam Christensen Jr., CMA on 12/4/2023 at 12:16 PM

## 2023-12-08 ENCOUNTER — MYC MEDICAL ADVICE (OUTPATIENT)
Dept: INTERNAL MEDICINE | Facility: CLINIC | Age: 53
End: 2023-12-08
Payer: COMMERCIAL

## 2023-12-12 ENCOUNTER — VIRTUAL VISIT (OUTPATIENT)
Dept: INTERNAL MEDICINE | Facility: CLINIC | Age: 53
End: 2023-12-12
Payer: COMMERCIAL

## 2023-12-12 DIAGNOSIS — G44.011 INTRACTABLE EPISODIC CLUSTER HEADACHE: Primary | ICD-10-CM

## 2023-12-12 PROCEDURE — 99213 OFFICE O/P EST LOW 20 MIN: CPT | Mod: 93 | Performed by: INTERNAL MEDICINE

## 2023-12-12 NOTE — PROGRESS NOTES
Otoniel Cook is a 53 year oldwho is being evaluated via a billable telephone visit.       What phone number would you like to be contacted at? [phone]      Assessment & Plan  Intractable severe cluster headaches out of work since September 1, 2023 tentative return to work date January 31, 2024.  Meds listed see previous notes phone visit dated December 4, 2023.  Form completed for [employer] form should be returned to [employer] [facility] employee benefit resource center [address] [place] [address].  Secure fax [phone].  11 minutes spent on the date of the encounter doing chart review, patient visit and documentation and I spoke with the patient directly on the phone for completion of this supplemental form to HR works for 5 minutes.  We had a good discussion.  Patient is agreed to the entries I made on this return to work form.       Subjective  History as above intractable severe cluster headaches.  Severe headache yesterday.  Unable to work.  Unable to drive an automobile.  2 daughters and wife helpful in this regard.     Review of Systems   Denies chest pain shortness of breath no blood in stool or urine.  Medication list reviewed and reconciled.       Neeraj Lindsey MD

## 2023-12-20 ENCOUNTER — MYC MEDICAL ADVICE (OUTPATIENT)
Dept: INTERNAL MEDICINE | Facility: CLINIC | Age: 53
End: 2023-12-20
Payer: COMMERCIAL

## 2023-12-21 ENCOUNTER — MYC MEDICAL ADVICE (OUTPATIENT)
Dept: INTERNAL MEDICINE | Facility: CLINIC | Age: 53
End: 2023-12-21
Payer: COMMERCIAL

## 2023-12-21 NOTE — TELEPHONE ENCOUNTER
LMTCB on Roland's phone to see what they are needing for patient and if an ROSALBA was filled out for requesting these records.

## 2023-12-26 NOTE — TELEPHONE ENCOUNTER
Form completed and faxed out to [phone]     Copies made for monthly hold bin and sent to scan.

## 2024-01-25 ENCOUNTER — VIRTUAL VISIT (OUTPATIENT)
Dept: INTERNAL MEDICINE | Facility: CLINIC | Age: 54
End: 2024-01-25
Payer: COMMERCIAL

## 2024-01-25 DIAGNOSIS — F11.20 CONTINUOUS OPIOID DEPENDENCE (H): Primary | ICD-10-CM

## 2024-01-25 DIAGNOSIS — G44.011 INTRACTABLE EPISODIC CLUSTER HEADACHE: ICD-10-CM

## 2024-01-25 PROCEDURE — 99443 PR PHYSICIAN TELEPHONE EVALUATION 21-30 MIN: CPT | Mod: 93 | Performed by: INTERNAL MEDICINE

## 2024-01-25 NOTE — PROGRESS NOTES
Otoniel Cook is a 53 year oldwho is being evaluated via a billable telephone visit.       What phone number would you like to be contacted at? [phone]      Assessment & Plan  Cluster headaches intractable at times.  Uses sumatriptan and or Maxalt MLT at the first onset of headache pain and this seems to resolve.  The patient is anticipating seeing a neurologist again in April for consultation regarding cluster headaches which have been intractable and severe unpredictable.  Maxalt MLT 5 mg at the first onset of the headache seems to help also getting into a dark room.  The patient has really been unable to work in any gainful position since September 1, 2023.  Extensive forms were completed on the patient's behalf and should be sent to  [employer] resource center [address] [place] [address] fax [phone].  We had a good discussion today.  25 minutes spent on the date of the encounter doing chart review, patient visit and documentation and I spoke with the patient and completed the form in detail for 23 minutes.       Subjective  Intractable unpredictable cluster headaches disabling.  Affecting vision kaleidoscope vision with decreased peripheral vision and previous neurologic consultation confirmed the diagnosis of cluster headaches repeat neurologic consultation in the offing with a neurologist specializing in cluster headaches.     Review of Systems   No blood in stool or urine denies chest pain shortness of breath medication list reviewed reconciled in the chart.  Multiple medications have been tried and attempts to help this patient with these intractable severe cluster headaches and only medication that has been somewhat beneficial is Maxalt MLT 5 mg.  Taken at the first onset of the headache.  We had a good discussion today.  Forms were completed these were extensive.       Neeraj Lindsey MD

## 2024-01-29 RX ORDER — TRAMADOL HYDROCHLORIDE 50 MG/1
50 TABLET ORAL EVERY 6 HOURS PRN
Qty: 60 TABLET | Refills: 0 | Status: SHIPPED | OUTPATIENT
Start: 2024-01-29 | End: 2024-02-13

## 2024-04-09 DIAGNOSIS — G44.011 INTRACTABLE EPISODIC CLUSTER HEADACHE: ICD-10-CM

## 2024-04-09 RX ORDER — TRAMADOL HYDROCHLORIDE 50 MG/1
50 TABLET ORAL EVERY 6 HOURS PRN
Qty: 60 TABLET | Refills: 0 | OUTPATIENT
Start: 2024-04-09 | End: 2024-04-24

## 2024-04-23 DIAGNOSIS — R51.9 INTRACTABLE HEADACHE, UNSPECIFIED CHRONICITY PATTERN, UNSPECIFIED HEADACHE TYPE: ICD-10-CM

## 2024-04-23 NOTE — TELEPHONE ENCOUNTER
Refill request for: rizatriptan 5mg   Directions: Take 1 tablet (5 mg) by mouth at onset of headache for migraine May repeat in 2 hours. Max 6 tablets/24 hours     LOV: 09/25/23  NOV: Has upcoming appt on 5/10/24 with Dr. Quintero    30 day supply with 0 refills Medication T'd for review and signature      Mónica Rodriguez LPN on 4/23/2024 at 1:37 PM

## 2024-04-25 DIAGNOSIS — R51.9 INTRACTABLE HEADACHE, UNSPECIFIED CHRONICITY PATTERN, UNSPECIFIED HEADACHE TYPE: ICD-10-CM

## 2024-04-25 RX ORDER — RIZATRIPTAN BENZOATE 5 MG/1
5 TABLET, ORALLY DISINTEGRATING ORAL
Qty: 18 TABLET | Refills: 0 | OUTPATIENT
Start: 2024-04-25

## 2024-04-25 RX ORDER — RIZATRIPTAN BENZOATE 5 MG/1
TABLET, ORALLY DISINTEGRATING ORAL
Qty: 18 TABLET | Refills: 1 | OUTPATIENT
Start: 2024-04-25

## 2024-05-08 ENCOUNTER — MYC MEDICAL ADVICE (OUTPATIENT)
Dept: INTERNAL MEDICINE | Facility: CLINIC | Age: 54
End: 2024-05-08
Payer: COMMERCIAL

## 2024-05-08 NOTE — TELEPHONE ENCOUNTER
Received FMLA forms for pt. Appt is needed. [employer] msg sent to pt regarding this.       Sam Christensen Jr., CMA on 5/8/2024 at 1:40 PM

## 2024-05-15 ASSESSMENT — PATIENT HEALTH QUESTIONNAIRE - PHQ9
10. IF YOU CHECKED OFF ANY PROBLEMS, HOW DIFFICULT HAVE THESE PROBLEMS MADE IT FOR YOU TO DO YOUR WORK, TAKE CARE OF THINGS AT HOME, OR GET ALONG WITH OTHER PEOPLE: EXTREMELY DIFFICULT
SUM OF ALL RESPONSES TO PHQ QUESTIONS 1-9: 24
SUM OF ALL RESPONSES TO PHQ QUESTIONS 1-9: 24

## 2024-05-16 ENCOUNTER — OFFICE VISIT (OUTPATIENT)
Dept: INTERNAL MEDICINE | Facility: CLINIC | Age: 54
End: 2024-05-16
Payer: COMMERCIAL

## 2024-05-16 VITALS
RESPIRATION RATE: 16 BRPM | DIASTOLIC BLOOD PRESSURE: 92 MMHG | HEIGHT: 72 IN | BODY MASS INDEX: 27.98 KG/M2 | SYSTOLIC BLOOD PRESSURE: 140 MMHG | OXYGEN SATURATION: 97 % | TEMPERATURE: 98 F | WEIGHT: 206.6 LBS | HEART RATE: 87 BPM

## 2024-05-16 DIAGNOSIS — G44.011 INTRACTABLE EPISODIC CLUSTER HEADACHE: Primary | ICD-10-CM

## 2024-05-16 PROBLEM — F11.20 CONTINUOUS OPIOID DEPENDENCE (H): Status: RESOLVED | Noted: 2024-01-25 | Resolved: 2024-05-16

## 2024-05-16 PROCEDURE — 99214 OFFICE O/P EST MOD 30 MIN: CPT | Performed by: INTERNAL MEDICINE

## 2024-05-16 ASSESSMENT — ANXIETY QUESTIONNAIRES
7. FEELING AFRAID AS IF SOMETHING AWFUL MIGHT HAPPEN: NEARLY EVERY DAY
7. FEELING AFRAID AS IF SOMETHING AWFUL MIGHT HAPPEN: NEARLY EVERY DAY
3. WORRYING TOO MUCH ABOUT DIFFERENT THINGS: NEARLY EVERY DAY
1. FEELING NERVOUS, ANXIOUS, OR ON EDGE: NEARLY EVERY DAY
8. IF YOU CHECKED OFF ANY PROBLEMS, HOW DIFFICULT HAVE THESE MADE IT FOR YOU TO DO YOUR WORK, TAKE CARE OF THINGS AT HOME, OR GET ALONG WITH OTHER PEOPLE?: EXTREMELY DIFFICULT
GAD7 TOTAL SCORE: 21
4. TROUBLE RELAXING: NEARLY EVERY DAY
IF YOU CHECKED OFF ANY PROBLEMS ON THIS QUESTIONNAIRE, HOW DIFFICULT HAVE THESE PROBLEMS MADE IT FOR YOU TO DO YOUR WORK, TAKE CARE OF THINGS AT HOME, OR GET ALONG WITH OTHER PEOPLE: EXTREMELY DIFFICULT
GAD7 TOTAL SCORE: 21
5. BEING SO RESTLESS THAT IT IS HARD TO SIT STILL: NEARLY EVERY DAY
2. NOT BEING ABLE TO STOP OR CONTROL WORRYING: NEARLY EVERY DAY
6. BECOMING EASILY ANNOYED OR IRRITABLE: NEARLY EVERY DAY

## 2024-05-16 ASSESSMENT — PAIN SCALES - GENERAL: PAINLEVEL: MODERATE PAIN (4)

## 2024-05-16 NOTE — PROGRESS NOTES
Assessment/Plan:    Intractable cluster headaches with possible cold recurring migraine headaches with visual scotomata.  Kaleidoscope left eye.  Discussed.  Not dependent on opioids rarely uses tramadol.  Today removed opioid dependence as a problem list and treatment.  Complex and detailed and extensive forms completed for disability insurance.  [place].    25 minutes spent on the date of the encounter doing chart review, interpretation of tests, patient visit, and documentation     Subjective:  Otoniel Cook is a 54 year old male presents for the following health issues opioid dependence not a problem list issue and should be removed per patient's report rarely uses tramadol.  Discussed and this was removed or attempted to be removed today by this examiner.    Lele did not get.    Cluster headaches right eye stabbing pain intractable and disabling.  Disability forms completed for [employer].  Unable to work first had this problem around September 1, 2023 and it continues.  Wife is had seizures added stress in his family.  2 daughters 1 recently graduated from [employer] in [place] and headed to a doctorate program in [place].  Lost his job  severe headaches intractable difficult to treat difficult to manage some help with Maxalt or Imitrex.  Some help with gabapentin.  Not using Flexeril.  Previously tried prednisone previously seen by neurologist imaging studies reviewed and not aluminating.    ROS:  No blood in stool or urine no chest pain shortness of breath medication list reviewed reconciled.    Problems with billing of a colonoscopy done 3 years ago by Dr. BRANNON [facility].  He had it at age 51.  I have recommended a colonoscopy to him because of his age at that time 51.  Still handling with colorectal surgery and good bill thousands of dollars he says.  Added stress for the patient.    Applying now for so security disability at the younger age of 54.    Objective:  BP  (!) 140/92 (BP Location: Right arm, Patient Position: Sitting, Cuff Size: Adult Regular)   Pulse 87   Temp 98  F (36.7  C) (Oral)   Resp 16   Ht 1.829 m (6')   Wt 93.7 kg (206 lb 9.6 oz)   SpO2 97%   BMI 28.02 kg/m    Neck veins nondistended no thyromegaly no thyroid nodules no carotid bruits chest clear heart tones normal describes headaches that are migrainous in features with kaleidoscope like scotomata left eye followed by a severe headache.  May have 2 types of headaches cluster headaches migraine headaches.  Abdomen benign mild centripetal obesity noted BMI 28 blood pressure borderline elevated 140/92 certainly not helping headaches extremities free of edema cyanosis or clubbing good pulse motor in all 4 extremities.  We had a good discussion and a lengthy examination.    Neeraj Lindsey MD  Internal Medicine    The longitudinal plan of care for the diagnosis(es)/condition(s) as documented were addressed during this visit. Due to the added complexity in care, I will continue to support Oscar in the subsequent management and with ongoing continuity of care.      Answers submitted by the patient for this visit:  Patient Health Questionnaire (Submitted on 5/15/2024)  If you checked off any problems, how difficult have these problems made it for you to do your work, take care of things at home, or get along with other people?: Extremely difficult  PHQ9 TOTAL SCORE: 24  KRISTEL-7 (Submitted on 5/16/2024)  KRISTEL 7 TOTAL SCORE: 21  Migraine Visit Questionnaire (Submitted on 5/15/2024)  Chief Complaint: Chronic problems general questions HPI Form  Headache Symptoms are: worsened  How often are you getting headaches or migraines? : Almost daily  Are you able to do normal daily activities when you have a migraine?: No  Migraine Rescue/Relief Medications:: Ibuprofen (Advil, Motrin), Tylenol, sumatriptan (Imitrex), other  Effectiveness of rescue/relief medications:: I get only a small amount of relief  Migraine  Preventative Medications:: other  ER or UC Visits:: 0 times  General Questionnaire (Submitted on 5/15/2024)  Chief Complaint: Chronic problems general questions HPI Form  How many servings of fruits and vegetables do you eat daily?: 0-1  On average, how many sweetened beverages do you drink each day (Examples: soda, juice, sweet tea, etc.  Do NOT count diet or artificially sweetened beverages)?: 1  How many minutes a day do you exercise enough to make your heart beat faster?: 9 or less  How many days a week do you exercise enough to make your heart beat faster?: 3 or less  How many days per week do you miss taking your medication?: 0

## 2024-05-16 NOTE — TELEPHONE ENCOUNTER
Placed FMLA forms on PCP's desk for review as pt is scheduled for today.       Sam Christensen Jr., CMA on 5/16/2024 at 6:27 AM

## 2024-05-17 NOTE — TELEPHONE ENCOUNTER
Pt completed appt. Forms have been faxed along with last OV notes.       Copies made for hold bin. Originals placed in scanning.       Sam Christensen Jr., CMA on 5/17/2024 at 5:09 PM

## 2024-05-30 ENCOUNTER — OFFICE VISIT (OUTPATIENT)
Dept: NEUROLOGY | Facility: CLINIC | Age: 54
End: 2024-05-30
Payer: COMMERCIAL

## 2024-05-30 VITALS
WEIGHT: 205 LBS | HEIGHT: 72 IN | SYSTOLIC BLOOD PRESSURE: 141 MMHG | HEART RATE: 79 BPM | DIASTOLIC BLOOD PRESSURE: 96 MMHG | BODY MASS INDEX: 27.77 KG/M2

## 2024-05-30 DIAGNOSIS — G44.029 CHRONIC CLUSTER HEADACHE, NOT INTRACTABLE: Primary | ICD-10-CM

## 2024-05-30 PROCEDURE — 99214 OFFICE O/P EST MOD 30 MIN: CPT

## 2024-05-30 RX ORDER — VERAPAMIL HYDROCHLORIDE 80 MG/1
80 TABLET ORAL 2 TIMES DAILY
Qty: 60 TABLET | Refills: 4 | Status: SHIPPED | OUTPATIENT
Start: 2024-05-30 | End: 2024-09-17
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
## 2024-05-30 NOTE — PATIENT INSTRUCTIONS
Plan:  --- Start verapamil 80 mg twice daily for cluster headaches  --- Continue with Rizatriptan for abortive therapy  -- Limit rizatriptan to less than 12 times a month to avoid rebound headaches  --- Plan on follow up in the Neurology Clinic in 3 months with me and 9 months to jasiel with a andie meurologist.  --- Please feel free to reach out if you have any further questions or concerns.  --- Seek immediate medical attention if an emergency arises or if your health becomes progressively worse.     It was a pleasure meeting you today!

## 2024-05-30 NOTE — NURSING NOTE
Chief Complaint   Patient presents with    Headache     Patient states he is having cluster headaches. He is also having 1 migraines per week.     Sharri Frost on 5/30/2024 at 1:54 PM
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
## 2024-06-06 ENCOUNTER — MYC MEDICAL ADVICE (OUTPATIENT)
Dept: INTERNAL MEDICINE | Facility: CLINIC | Age: 54
End: 2024-06-06
Payer: COMMERCIAL

## 2024-06-06 ENCOUNTER — MYC MEDICAL ADVICE (OUTPATIENT)
Dept: NEUROLOGY | Facility: CLINIC | Age: 54
End: 2024-06-06
Payer: COMMERCIAL

## 2024-06-10 NOTE — TELEPHONE ENCOUNTER
Paperwork completed. Please help patient establish with a neurologist for follow up.     Thank you,   OSCAR Basurto CNP

## 2024-06-19 ENCOUNTER — MYC MEDICAL ADVICE (OUTPATIENT)
Dept: INTERNAL MEDICINE | Facility: CLINIC | Age: 54
End: 2024-06-19
Payer: COMMERCIAL

## 2024-06-19 DIAGNOSIS — R51.9 INTRACTABLE HEADACHE, UNSPECIFIED CHRONICITY PATTERN, UNSPECIFIED HEADACHE TYPE: ICD-10-CM

## 2024-06-19 RX ORDER — RIZATRIPTAN BENZOATE 5 MG/1
5 TABLET, ORALLY DISINTEGRATING ORAL
Qty: 18 TABLET | Refills: 2 | Status: SHIPPED | OUTPATIENT
Start: 2024-06-19

## 2024-06-19 NOTE — TELEPHONE ENCOUNTER
Refill request for: Rizatriptan    Directions: Take 1 tablet at onset of headache may repeat in 2 hours.     LOV: 5/30/24  NOV: 9/6/24    30 day supply with 2 refills Medication T'd for review and signature

## 2024-06-23 ENCOUNTER — HEALTH MAINTENANCE LETTER (OUTPATIENT)
Age: 54
End: 2024-06-23

## 2024-06-24 ENCOUNTER — MYC MEDICAL ADVICE (OUTPATIENT)
Dept: INTERNAL MEDICINE | Facility: CLINIC | Age: 54
End: 2024-06-24
Payer: COMMERCIAL

## 2024-08-15 DIAGNOSIS — Z00.00 ROUTINE GENERAL MEDICAL EXAMINATION AT A HEALTH CARE FACILITY: ICD-10-CM

## 2024-08-15 RX ORDER — IBUPROFEN 600 MG/1
TABLET, FILM COATED ORAL
Qty: 90 TABLET | Refills: 11 | Status: SHIPPED | OUTPATIENT
Start: 2024-08-15

## 2024-09-16 ENCOUNTER — MYC MEDICAL ADVICE (OUTPATIENT)
Dept: INTERNAL MEDICINE | Facility: CLINIC | Age: 54
End: 2024-09-16
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
## 2024-09-17 ENCOUNTER — OFFICE VISIT (OUTPATIENT)
Dept: NEUROLOGY | Facility: CLINIC | Age: 54
End: 2024-09-17
Payer: COMMERCIAL

## 2024-09-17 VITALS
HEART RATE: 68 BPM | SYSTOLIC BLOOD PRESSURE: 142 MMHG | DIASTOLIC BLOOD PRESSURE: 107 MMHG | HEIGHT: 72 IN | WEIGHT: 205 LBS | BODY MASS INDEX: 27.77 KG/M2

## 2024-09-17 DIAGNOSIS — G44.029 CHRONIC CLUSTER HEADACHE, NOT INTRACTABLE: ICD-10-CM

## 2024-09-17 PROCEDURE — 99213 OFFICE O/P EST LOW 20 MIN: CPT

## 2024-09-17 RX ORDER — VERAPAMIL HYDROCHLORIDE 80 MG/1
80 TABLET ORAL 3 TIMES DAILY
Qty: 90 TABLET | Refills: 5 | Status: SHIPPED | OUTPATIENT
Start: 2024-09-17

## 2024-09-17 NOTE — PATIENT INSTRUCTIONS
Plan:  --- Start verapamil 80 mg three times daily for cluster headaches  --- Continue with Rizatriptan for abortive therapy  -- Limit rizatriptan to less than 12 times a month to avoid rebound headaches  --- Plan on follow up in the Neurology Clinic in 4 months to jasiel with a andie meurologist.  --- Please feel free to reach out if you have any further questions or concerns.  --- Seek immediate medical attention if an emergency arises or if your health becomes progressively worse.     It was a pleasure meeting you today!

## 2024-09-17 NOTE — NURSING NOTE
Chief Complaint   Patient presents with    Headache     Patient is having cluster headaches daily. He is having migraines 1-2 weekly.     Sharri Frost on 9/17/2024 at 9:32 AM
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
## 2025-03-06 ENCOUNTER — PATIENT OUTREACH (OUTPATIENT)
Dept: CARE COORDINATION | Facility: CLINIC | Age: 55
End: 2025-03-06
Payer: COMMERCIAL

## 2025-03-12 SDOH — HEALTH STABILITY: PHYSICAL HEALTH: ON AVERAGE, HOW MANY MINUTES DO YOU ENGAGE IN EXERCISE AT THIS LEVEL?: 20 MIN

## 2025-03-12 SDOH — HEALTH STABILITY: PHYSICAL HEALTH: ON AVERAGE, HOW MANY DAYS PER WEEK DO YOU ENGAGE IN MODERATE TO STRENUOUS EXERCISE (LIKE A BRISK WALK)?: 3 DAYS

## 2025-03-12 ASSESSMENT — SOCIAL DETERMINANTS OF HEALTH (SDOH): HOW OFTEN DO YOU GET TOGETHER WITH FRIENDS OR RELATIVES?: PATIENT DECLINED

## 2025-04-01 DIAGNOSIS — G44.009 CLUSTER HEADACHE, NOT INTRACTABLE, UNSPECIFIED CHRONICITY PATTERN: ICD-10-CM

## 2025-04-01 RX ORDER — SUMATRIPTAN 50 MG/1
100 TABLET, FILM COATED ORAL
Qty: 20 TABLET | Refills: 4 | Status: SHIPPED | OUTPATIENT
Start: 2025-04-01

## 2025-04-09 SDOH — HEALTH STABILITY: PHYSICAL HEALTH: ON AVERAGE, HOW MANY DAYS PER WEEK DO YOU ENGAGE IN MODERATE TO STRENUOUS EXERCISE (LIKE A BRISK WALK)?: 3 DAYS

## 2025-04-09 SDOH — HEALTH STABILITY: PHYSICAL HEALTH: ON AVERAGE, HOW MANY MINUTES DO YOU ENGAGE IN EXERCISE AT THIS LEVEL?: 20 MIN

## 2025-04-09 ASSESSMENT — SOCIAL DETERMINANTS OF HEALTH (SDOH): HOW OFTEN DO YOU GET TOGETHER WITH FRIENDS OR RELATIVES?: ONCE A WEEK

## 2025-04-14 ENCOUNTER — OFFICE VISIT (OUTPATIENT)
Dept: INTERNAL MEDICINE | Facility: CLINIC | Age: 55
End: 2025-04-14
Payer: COMMERCIAL

## 2025-04-14 ENCOUNTER — MYC MEDICAL ADVICE (OUTPATIENT)
Dept: INTERNAL MEDICINE | Facility: CLINIC | Age: 55
End: 2025-04-14

## 2025-04-14 VITALS
HEART RATE: 78 BPM | RESPIRATION RATE: 16 BRPM | TEMPERATURE: 97.7 F | SYSTOLIC BLOOD PRESSURE: 126 MMHG | DIASTOLIC BLOOD PRESSURE: 80 MMHG | HEIGHT: 71 IN | BODY MASS INDEX: 27.83 KG/M2 | OXYGEN SATURATION: 97 % | WEIGHT: 198.8 LBS

## 2025-04-14 DIAGNOSIS — R10.84 ABDOMINAL PAIN, GENERALIZED: ICD-10-CM

## 2025-04-14 DIAGNOSIS — Z00.00 ROUTINE GENERAL MEDICAL EXAMINATION AT A HEALTH CARE FACILITY: Primary | ICD-10-CM

## 2025-04-14 DIAGNOSIS — K76.0 FATTY LIVER: ICD-10-CM

## 2025-04-14 LAB
ALBUMIN SERPL BCG-MCNC: 4.8 G/DL (ref 3.5–5.2)
ALBUMIN UR-MCNC: NEGATIVE MG/DL
ALP SERPL-CCNC: 58 U/L (ref 40–150)
ALT SERPL W P-5'-P-CCNC: 23 U/L (ref 0–70)
ANION GAP SERPL CALCULATED.3IONS-SCNC: 12 MMOL/L (ref 7–15)
APPEARANCE UR: CLEAR
AST SERPL W P-5'-P-CCNC: 24 U/L (ref 0–45)
BILIRUB SERPL-MCNC: 0.3 MG/DL
BILIRUB UR QL STRIP: NEGATIVE
BUN SERPL-MCNC: 10.3 MG/DL (ref 6–20)
CALCIUM SERPL-MCNC: 9.4 MG/DL (ref 8.8–10.4)
CHLORIDE SERPL-SCNC: 106 MMOL/L (ref 98–107)
CHOLEST SERPL-MCNC: 226 MG/DL
COLOR UR AUTO: YELLOW
CREAT SERPL-MCNC: 0.81 MG/DL (ref 0.67–1.17)
EGFRCR SERPLBLD CKD-EPI 2021: >90 ML/MIN/1.73M2
ERYTHROCYTE [DISTWIDTH] IN BLOOD BY AUTOMATED COUNT: 12.5 % (ref 10–15)
EST. AVERAGE GLUCOSE BLD GHB EST-MCNC: 108 MG/DL
FASTING STATUS PATIENT QL REPORTED: YES
FASTING STATUS PATIENT QL REPORTED: YES
GGT SERPL-CCNC: 22 U/L (ref 8–61)
GLUCOSE SERPL-MCNC: 94 MG/DL (ref 70–99)
GLUCOSE UR STRIP-MCNC: NEGATIVE MG/DL
HBA1C MFR BLD: 5.4 % (ref 0–5.6)
HCO3 SERPL-SCNC: 21 MMOL/L (ref 22–29)
HCT VFR BLD AUTO: 42.6 % (ref 40–53)
HDLC SERPL-MCNC: 32 MG/DL
HGB BLD-MCNC: 14.5 G/DL (ref 13.3–17.7)
HGB UR QL STRIP: NEGATIVE
KETONES UR STRIP-MCNC: NEGATIVE MG/DL
LDH SERPL L TO P-CCNC: 165 U/L (ref 0–250)
LDLC SERPL CALC-MCNC: 167 MG/DL
LEUKOCYTE ESTERASE UR QL STRIP: NEGATIVE
MCH RBC QN AUTO: 30 PG (ref 26.5–33)
MCHC RBC AUTO-ENTMCNC: 34 G/DL (ref 31.5–36.5)
MCV RBC AUTO: 88 FL (ref 78–100)
NITRATE UR QL: NEGATIVE
NONHDLC SERPL-MCNC: 194 MG/DL
PH UR STRIP: 6 [PH] (ref 5–8)
PLATELET # BLD AUTO: 212 10E3/UL (ref 150–450)
POTASSIUM SERPL-SCNC: 4.2 MMOL/L (ref 3.4–5.3)
PROT SERPL-MCNC: 7.3 G/DL (ref 6.4–8.3)
PSA SERPL DL<=0.01 NG/ML-MCNC: 1.3 NG/ML (ref 0–3.5)
RBC # BLD AUTO: 4.83 10E6/UL (ref 4.4–5.9)
SODIUM SERPL-SCNC: 139 MMOL/L (ref 135–145)
SP GR UR STRIP: <=1.005 (ref 1–1.03)
TRIGL SERPL-MCNC: 136 MG/DL
TSH SERPL DL<=0.005 MIU/L-ACNC: 3.55 UIU/ML (ref 0.3–4.2)
UROBILINOGEN UR STRIP-ACNC: 0.2 E.U./DL
VIT B12 SERPL-MCNC: 481 PG/ML (ref 232–1245)
WBC # BLD AUTO: 5.7 10E3/UL (ref 4–11)

## 2025-04-14 PROCEDURE — 99396 PREV VISIT EST AGE 40-64: CPT | Performed by: INTERNAL MEDICINE

## 2025-04-14 PROCEDURE — 82607 VITAMIN B-12: CPT | Performed by: INTERNAL MEDICINE

## 2025-04-14 PROCEDURE — 84443 ASSAY THYROID STIM HORMONE: CPT | Performed by: INTERNAL MEDICINE

## 2025-04-14 PROCEDURE — 83036 HEMOGLOBIN GLYCOSYLATED A1C: CPT | Performed by: INTERNAL MEDICINE

## 2025-04-14 PROCEDURE — 83615 LACTATE (LD) (LDH) ENZYME: CPT | Performed by: INTERNAL MEDICINE

## 2025-04-14 PROCEDURE — 82977 ASSAY OF GGT: CPT | Performed by: INTERNAL MEDICINE

## 2025-04-14 PROCEDURE — 36415 COLL VENOUS BLD VENIPUNCTURE: CPT | Performed by: INTERNAL MEDICINE

## 2025-04-14 PROCEDURE — 80061 LIPID PANEL: CPT | Performed by: INTERNAL MEDICINE

## 2025-04-14 PROCEDURE — 3074F SYST BP LT 130 MM HG: CPT | Performed by: INTERNAL MEDICINE

## 2025-04-14 PROCEDURE — 80053 COMPREHEN METABOLIC PANEL: CPT | Performed by: INTERNAL MEDICINE

## 2025-04-14 PROCEDURE — 81003 URINALYSIS AUTO W/O SCOPE: CPT | Performed by: INTERNAL MEDICINE

## 2025-04-14 PROCEDURE — 1126F AMNT PAIN NOTED NONE PRSNT: CPT | Performed by: INTERNAL MEDICINE

## 2025-04-14 PROCEDURE — G0103 PSA SCREENING: HCPCS | Performed by: INTERNAL MEDICINE

## 2025-04-14 PROCEDURE — 3079F DIAST BP 80-89 MM HG: CPT | Performed by: INTERNAL MEDICINE

## 2025-04-14 PROCEDURE — 85027 COMPLETE CBC AUTOMATED: CPT | Performed by: INTERNAL MEDICINE

## 2025-04-14 ASSESSMENT — PAIN SCALES - GENERAL: PAINLEVEL_OUTOF10: NO PAIN (0)

## 2025-04-14 NOTE — PROGRESS NOTES
Preventive Care Visit  [facility]  Neeraj Lindsey MD, Internal Medicine  Apr 14, 2025  {Provider  Link to [facility] :[id number]}    {PROVIDER CHARTING PREFERENCE:707605}    Subjective   Oscar is a 55 year old, presenting for the following:  Physical (Discuss neuropathy in feet and toes and cluster headaches. ) and Imm/Inj (Patient wants to know if he can get the Hep B, Pneumococcal and Tetanus shot. )        4/14/2025     9:01 AM   Additional Questions   Roomed by Royer JOY MA          Imm/Inj      ***   {MA/LPN/RN Pre-Provider Visit Orders- hCG/UA/Strep (Optional):258254}  {SUPERLIST (Optional):229529}  {additonal problems for provider to add (Optional):949032}  Advance Care Planning  Patient does not have a Health Care Directive: {ADVANCE_DIRECTIVE_STATUS:220314}      4/9/2025   General Health   How would you rate your overall physical health? (!) FAIR   Feel stress (tense, anxious, or unable to sleep) Patient declined         4/9/2025   Nutrition   Three or more servings of calcium each day? Yes   Diet: Low salt    Low fat/cholesterol    Carbohydrate counting    Breakfast skipped   How many servings of fruit and vegetables per day? (!) 2-3   How many sweetened beverages each day? 0-1       Multiple values from one day are sorted in reverse-chronological order         4/9/2025   Exercise   Days per week of moderate/strenous exercise 3 days   Average minutes spent exercising at this level 20 min         4/9/2025   Social Factors   Frequency of gathering with friends or relatives Once a week   Worry food won't last until get money to buy more No   Food not last or not have enough money for food? No   Do you have housing? (Housing is defined as stable permanent housing and does not include staying ouside in a car, in a tent, in an abandoned building, in an overnight shelter, or couch-surfing.) No   Are you worried about losing your housing? No   Lack of transportation? Yes   Unable to get  utilities (heat,electricity)? No   Want help with housing or utility concern? No    (!) TRANSPORTATION CONCERN PRESENT(!) HOUSING CONCERN PRESENT      2025   Fall Risk   Reason Gait Speed Test Not Completed Patient declines          2025   Dental   Dentist two times every year? (!) NO           Today's PHQ-2 Score:       2025     8:50 AM   PHQ-2 (  [employer])   Q1: Little interest or pleasure in doing things 1    Q2: Feeling down, depressed or hopeless 1    PHQ-2 Score 2    Q1: Little interest or pleasure in doing things Several days   Q2: Feeling down, depressed or hopeless Several days   PHQ-2 Score 2       Proxy-reported           2025   Substance Use   Alcohol more than 3/day or more than 7/wk No   Do you use any other substances recreationally? No     Social History     Tobacco Use    Smoking status: Former     Current packs/day: 0.00     Average packs/day: 0.5 packs/day for 20.0 years (10.0 ttl pk-yrs)     Types: Cigarettes     Start date: 1990     Quit date: 2010     Years since quittin.3    Smokeless tobacco: Never    Tobacco comments:     Social smoker.   Substance Use Topics    Alcohol use: Not Currently     Alcohol/week: 4.0 standard drinks of alcohol     Types: 4 Cans of beer per week    Drug use: No     {Provider  If there are gaps in the social history shown above, please follow the link to update and then refresh the note Link to Social and Substance History :023298}        2025   One time HIV Screening   Previous HIV test? No         2025   STI Screening   New sexual partner(s) since last STI/HIV test? No   ASCVD Risk   The 10-year ASCVD risk score (Letty BOSWELL, et al., 2019) is: 4%    Values used to calculate the score:      Age: 55 years      Sex: Male      Is Non- : No      Diabetic: No      Tobacco smoker: No      Systolic Blood Pressure: 126 mmHg      Is BP treated: No      HDL Cholesterol: 48 mg/dL      Total  "Cholesterol: 163 mg/dL    {Link to Fracture Risk Assessment Tool (Optional):060662}    {Provider  REQUIRED FOR AWV Use the storyboard to review patient history, after sections have been marked as reviewed, refresh note to capture documentation:523761}   Reviewed and updated as needed this visit by Provider     Meds                {HISTORY OPTIONS (Optional):408133}    {ROS Picklists (Optional):612922}     Objective    Exam  /80   Pulse 78   Temp 97.7  F (36.5  C) (Oral)   Resp 16   Ht 1.816 m (5' 11.5\")   Wt 90.2 kg (198 lb 12.8 oz)   SpO2 97%   BMI 27.34 kg/m     Estimated body mass index is 27.34 kg/m  as calculated from the following:    Height as of this encounter: 1.816 m (5' 11.5\").    Weight as of this encounter: 90.2 kg (198 lb 12.8 oz).    Physical Exam  {Exam Choices (Optional):844292}        Signed Electronically by: Neeraj Lindsey MD  {Email feedback regarding this note to [email or web address]   :290100}  "

## 2025-04-14 NOTE — PROGRESS NOTES
Office Visit - Physical    Otoniel Cook   55 year old male    Date of Visit: 4/14/2025    Chief Complaint   Patient presents with    Physical     Discuss neuropathy in feet and toes and cluster headaches.     Imm/Inj     Patient wants to know if he can get the Hep B, Pneumococcal and Tetanus shot.        Subjective: Physical examination preventive health visit for this 55-year-old  cluster headaches severe abdominal pain history of fatty liver colonoscopy and EGD advised with Dr. Elmira Madison at [facility].  Patient declined formal GI consult.  Numbers given for [facility] at [phone].  Grandfather with Crohn's.  No weight loss other than intentional weight loss 2005 to 195 pounds.  Fatty liver treatment.  Requested labs reviewed with this patient in detail closely.    ROS: A comprehensive review of systems was performed and was otherwise negative    Medications:   Prior to Admission medications    Medication Sig Start Date End Date Taking? Authorizing Provider   ibuprofen (ADVIL/MOTRIN) 600 MG tablet TAKE 1 TABLET THREE TIMES A DAY. 8/15/24  Yes Neeraj Lindsey MD   rizatriptan (MAXALT-MLT) 5 MG ODT Take 1 tablet (5 mg) by mouth at onset of headache for migraine May repeat in 2 hours. Max 6 tablets/24 hours. 6/19/24  Yes Roxana Reyes APRN CNP   SUMAtriptan (IMITREX) 50 MG tablet TAKE 2 TABLETS (100 MG) BY MOUTH AT ONSET OF HEADACHE FOR MIGRAINE MAY REPEAT IN 2 HOURS. MAX 4 TABLETS/24 HOURS. 4/1/25  Yes Neeraj Lindsey MD       Allergies:No Known Allergies    Immunizations:   Immunization History   Administered Date(s) Administered    COVID-19 Monovalent 18+ (Moderna) 04/08/2021, 05/06/2021, 11/15/2021    Influenza Vaccine 18-64 (Flublok) 10/12/2021    Influenza Vaccine >6 months,quad, PF 09/12/2020, 11/09/2022, 10/21/2023    Influenza Vaccine, 6+MO IM (QUADRIVALENT W/PRESERVATIVES) 11/14/2018, 10/16/2019    TDAP (Adacel,Boostrix) 06/26/2006    Td,adult,historic,unspecified  06/26/2006    Tetanus 04/14/2015    Zoster recombinant adjuvanted (Shingrix) 03/31/2023       Health Maintenance: Immunizations vaccines reviewed and up-to-date.    Past Medical History: Past history of cluster headaches history of cervical disc disease surgery.  Dr. Pastor Lambert history of fatty liver.  Weight loss has been recommended and achieved see above.    Past Surgical History: Prior history of cervical disc disease surgery posterior approach Dr. Pastor Lambert.  Prior history of colonoscopy done with colorectal surgery group all clear.  No anesthetic complications from any prior surgery.    Family History: Both parents are living father is age 83 or 84 with a [place] tumor survivor.    Mother breast cancer survivor at age 74 1 sister with brain metastasis from lung cancer never a smoker lives in [place].  2 daughters well wife well recovering from colon surgery.  With postoperative GI bleed.    Social History:  by trade close to his wife and 2 daughters.    Exam easily conversant good spirited not in acute distress.  Colonoscopy and EGD testing has been advised for evaluation of abdominal pain    Chest clear heart tones normal abdomen benign genital rectal exam negative testicular exam normal scrotal contents normal no groin hernias extremities are free of edema cyanosis or clubbing skin well tanned lymph negative neuro negative psych normal neck veins not distended no carotid bruits left or right thyroid not enlarged no carotid bruits no lymphadenopathy appreciated lymph bearing areas he appeared well healthy.    Assessment and Plan  (Z00.00) Routine general medical examination at a health care facility  (primary encounter diagnosis)  Comment: General Examination was excellent.  Plan: CBC with platelets, Comprehensive metabolic         panel, Hemoglobin A1c, Lipid panel reflex to         direct LDL Fasting, TSH, UA Macroscopic with         reflex to Microscopic and Culture, Prostate          Specific Antigen Screen, Vitamin B12, Lactate         Dehydrogenase, GGT        Continue weight loss program as he has been successful in losing about 10 pounds of weight.  See above 2052 195 pounds currently.    (K76.0) Fatty liver  Comment: Weight loss is recommended.  With [facility] consult with Dr. Contreras Madison or associate at [phone].  Plan: Colonoscopy and EGD testing have been recommended with [facility] Dr. Cook presiding.  And [phone].    (R10.84) Abdominal pain, generalized  Comment: Father with Crohn's disease no associated unintentional weight loss.  No blood in the stool or urine.  No signs of obstruction.  Intestinal  Plan: Consult order recommended but patient declined at this time.    Peripheral neuropathy sensory patient does not use alcohol.  No motor component.  The patient is concerned regarding hyperglycemia diabetes mellitus type 2 A1c blood sugar pending.  In addition vitamin B12 level and TSH levels are ordered.  If further evaluation is needed the patient may require reconsultation with neurologist.    Prior history of cluster headaches.  Previously has used Flexeril along with gabapentin and Maxalt sumatriptan or Imitrex.  Along with verapamil 80 mg daily.  Suggest continue close follow-up with neurology group.        The following high BMI interventions were performed this visit: encouragement to exercise    Neeraj Lindsey MD    Internal Medicine    Patient Active Problem List   Diagnosis    Sinus Bradycardia    Atypical Chest Pain    General medical exam    Vasovagal syncope    H/O multiple concussions    Cervical herniated disc

## 2025-05-06 ENCOUNTER — NURSE TRIAGE (OUTPATIENT)
Dept: INTERNAL MEDICINE | Facility: CLINIC | Age: 55
End: 2025-05-06
Payer: COMMERCIAL

## 2025-05-06 ENCOUNTER — MYC MEDICAL ADVICE (OUTPATIENT)
Dept: INTERNAL MEDICINE | Facility: CLINIC | Age: 55
End: 2025-05-06
Payer: COMMERCIAL

## 2025-05-06 DIAGNOSIS — Z00.00 ROUTINE GENERAL MEDICAL EXAMINATION AT A HEALTH CARE FACILITY: ICD-10-CM

## 2025-05-06 RX ORDER — METHYLPREDNISOLONE 4 MG/1
TABLET ORAL
Qty: 21 TABLET | Refills: 1 | OUTPATIENT
Start: 2025-05-06

## 2025-05-06 RX ORDER — CYCLOBENZAPRINE HCL 5 MG
5 TABLET ORAL
Qty: 20 TABLET | Refills: 6 | Status: SHIPPED | OUTPATIENT
Start: 2025-05-06

## 2025-05-06 NOTE — TELEPHONE ENCOUNTER
Patient should be seen to determine if steroid is needed and to determine if NSGY referral is needed (I see he requested in [employer] messages today)

## 2025-05-09 NOTE — TELEPHONE ENCOUNTER
"Nurse Triage SBAR    Is this a 2nd Level Triage? YES, LICENSED PRACTITIONER REVIEW IS REQUIRED    Situation: back mid pain    Background: Has hurt same mid back area twice in the past and this is the 3rd time. History of vasosyncopy per pt.    Assessment: Pt was golfing Sunday. On 5/6 morning, pt stated bent over wrong which caused mid back pain to be a 10/10 rating. Now pain is 3-4/10, but when pt twists his mid abdomen section it takes breath away. He's now taking Ibuprofen 600mg and flexeril--helps a lot, but considers himself a \"total different person\" after 5/6.    Protocol Recommended Disposition:   See in Office Within 3 Days    Recommendation: Appointment with PCP was scheduled. Pt was educated to call the clinic if develop fever, numbness, extreme pain, or worsening symptoms. Pt verbalized understanding.    Pt also wondering about steroid medication and was wondering if he can get that today. We advised him that we'll request from covering provider but that he should also follow up with Dr. Lindsey at his appointment.    Routed to provider    Does the patient meet one of the following criteria for ADS visit consideration? 16+ years old, with an [facility] PCP     TIP  Providers, please consider if this condition is appropriate for management at one of our Acute and Diagnostic Services sites.     If patient is a good candidate, please use dotphrase <dot>triageresponse and select Refer to ADS to document.       Reason for Disposition   Patient wants to be seen    Additional Information   Negative: Passed out (e.g., fainted, lost consciousness, blacked out and was not responding)   Negative: Shock suspected (e.g., cold/pale/clammy skin, too weak to stand, low BP, rapid pulse)   Negative: Sounds like a life-threatening emergency to the triager   Negative: Major injury to the back (e.g., MVA, fall > 10 feet or 3 meters, penetrating injury, etc.)   Negative: Pain in the upper back over the ribs (rib cage) that " "radiates (travels) into the chest   Negative: Pain in the upper back over the ribs (rib cage) and worsened by coughing (or clearly increases with breathing)   Negative: Back pain during pregnancy   Negative: SEVERE back pain of sudden onset and age > 60 years   Negative: SEVERE abdominal pain (e.g., excruciating)   Negative: Abdominal pain and age > 60 years   Negative: Unable to urinate (or only a few drops) and bladder feels very full   Negative: Loss of bladder or bowel control (urine or bowel incontinence; wetting self, leaking stool) of new-onset   Negative: Numbness (loss of sensation) in groin or rectal area   Negative: Pain radiates into groin, scrotum   Negative: Blood in urine (red, pink, or tea-colored)   Negative: Vomiting and pain over lower ribs of back (i.e., flank - kidney area)   Negative: Weakness of a leg or foot (e.g., unable to bear weight, dragging foot)   Negative: Patient sounds very sick or weak to the triager   Negative: Fever > 100.4 F (38.0 C) and flank pain   Negative: Pain or burning with passing urine (urination)   Negative: SEVERE back pain (e.g., excruciating, unable to do any normal activities) and not improved after pain medicine and CARE ADVICE   Negative: Numbness in an arm or hand (i.e., loss of sensation) and upper back pain   Negative: Numbness in a leg or foot (i.e., loss of sensation)   Negative: High-risk adult (e.g., history of cancer, history of HIV, or history of IV Drug Use)   Negative: Soft tissue infection (e.g., abscess, cellulitis) or other serious infection (e.g., bacteremia) in last 2 weeks   Negative: Painful rash with multiple small blisters grouped together (i.e., dermatomal distribution or 'band' or 'stripe')   Negative: Pain radiates into the thigh or further down the leg, and in both legs   Negative: Age > 50 and no history of prior similar back pain    Answer Assessment - Initial Assessment Questions  1. ONSET: \"When did the pain begin?\" (e.g., minutes, " "hours, days)      5/6 morning, Sunday bucket of balls at range, bent over weird  2. LOCATION: \"Where does it hurt?\" (upper, mid or lower back)      Mid back  3. SEVERITY: \"How bad is the pain?\"  (e.g., Scale 1-10; mild, moderate, or severe)      When it happened 10/10, now 3-4/10, twist middle section takes breath away  4. PATTERN: \"Is the pain constant?\" (e.g., yes, no; constant, intermittent)       No  5. RADIATION: \"Does the pain shoot into your legs or somewhere else?\"      Wraps around mid section muscles  6. CAUSE:  \"What do you think is causing the back pain?\"       *No Answer*  7. BACK OVERUSE:  \"Any recent lifting of heavy objects, strenuous work or exercise?\"      *No Answer*  8. MEDICINES: \"What have you taken so far for the pain?\" (e.g., nothing, acetaminophen, NSAIDS)      Ibuprofen 600mg and flexeril--helps a lot, \"total different person\"  9. NEUROLOGIC SYMPTOMS: \"Do you have any weakness, numbness, or problems with bowel/bladder control?\"      no  10. OTHER SYMPTOMS: \"Do you have any other symptoms?\" (e.g., fever, abdomen pain, burning with urination, blood in urine)        No  11. PREGNANCY: \"Is there any chance you are pregnant?\" \"When was your last menstrual period?\"        *No Answer*    Vagosyncopy    Protocols used: Back Pain-A-[place]    "

## 2025-05-09 NOTE — TELEPHONE ENCOUNTER
Provider Response to 2nd Level Triage Request    I have reviewed the RN documentation. My recommendation is:  Virtual Visit Same Day: work patient in on my schedule as an overbook.

## 2025-05-12 ENCOUNTER — VIRTUAL VISIT (OUTPATIENT)
Dept: INTERNAL MEDICINE | Facility: CLINIC | Age: 55
End: 2025-05-12
Payer: COMMERCIAL

## 2025-05-12 DIAGNOSIS — M54.6 ACUTE BILATERAL THORACIC BACK PAIN: Primary | ICD-10-CM

## 2025-05-12 PROCEDURE — 98013 SYNCH AUDIO-ONLY EST LOW 20: CPT | Performed by: INTERNAL MEDICINE

## 2025-05-12 ASSESSMENT — ANXIETY QUESTIONNAIRES
GAD7 TOTAL SCORE: 5
6. BECOMING EASILY ANNOYED OR IRRITABLE: NOT AT ALL
1. FEELING NERVOUS, ANXIOUS, OR ON EDGE: SEVERAL DAYS
4. TROUBLE RELAXING: SEVERAL DAYS
GAD7 TOTAL SCORE: 5
5. BEING SO RESTLESS THAT IT IS HARD TO SIT STILL: NOT AT ALL
7. FEELING AFRAID AS IF SOMETHING AWFUL MIGHT HAPPEN: SEVERAL DAYS
IF YOU CHECKED OFF ANY PROBLEMS ON THIS QUESTIONNAIRE, HOW DIFFICULT HAVE THESE PROBLEMS MADE IT FOR YOU TO DO YOUR WORK, TAKE CARE OF THINGS AT HOME, OR GET ALONG WITH OTHER PEOPLE: SOMEWHAT DIFFICULT
3. WORRYING TOO MUCH ABOUT DIFFERENT THINGS: SEVERAL DAYS
2. NOT BEING ABLE TO STOP OR CONTROL WORRYING: SEVERAL DAYS
GAD7 TOTAL SCORE: INCOMPLETE

## 2025-05-12 ASSESSMENT — ENCOUNTER SYMPTOMS: BACK PAIN: 1

## 2025-05-12 NOTE — PROGRESS NOTES
Otoniel Cook is a 55 year oldwho is being evaluated via a billable telephone visit.       What phone number would you like to be contacted at? [phone]      Assessment & Plan  (M54.6) Acute bilateral thoracic back pain  (primary encounter diagnosis)  Comment: Acute onset of back pain with bending over thoracic spine breast level.  No associated rash present for 6 days no antecedent injury or trauma.  Previous history of same.  On Flexeril plus ibuprofen.  Offered x-rays of the thoracic lumbar area but patient preferred magnetic resonance imaging study.  Plan: MR Thoracic Spine w/o Contrast        Ordered and we will discuss it a few days.  Continue ibuprofen and Flexeril.  The latter combination has relieved 80% of the pain.         15 minutes spent on the date of the encounter doing chart review, patient visit and documentation I spoke with patient directly on the phone during this telephone visit for 11 minutes.  The patient was in his [facility] home and I was here at the [facility] for this telephone virtual visit.       Subjective  No antecedent injury or trauma no associated rash back pain has been present for 6 days it wraps around his chest near the breast level left side right side.  No rash or shingles.  The percent of the pain has been alleviated by muscle relaxant.  Flexeril plus OTC ibuprofen.  Prior history of cervical neck disease requiring neurosurgical intervention previously remote past.     Review of Systems   No blood in stool or urine denies chest pain or shortness of breath associated with exertion.  No nausea or vomiting med list reviewed reconciled in the chart there is no radiation into either leg there is no signs or symptoms of cord impingement.  No cauda equina.       Neeraj Lindsey MD    The longitudinal plan of care for the diagnosis(es)/condition(s) as documented were addressed during this visit. Due to the added complexity in care,  I will continue to support Oscar in the subsequent management and with ongoing continuity of care.    Answers submitted by the patient for this visit:  Patient Health Questionnaire (G7) (Submitted on 5/12/2025)  KRISTEL 7 TOTAL SCORE: Incomplete  General Questionnaire (Submitted on 5/12/2025)  Chief Complaint: Chronic problems general questions HPI Form  What is the reason for your visit today? : Mid back pain  Questionnaire about: Chronic problems general questions HPI Form (Submitted on 5/12/2025)  Chief Complaint: Chronic problems general questions HPI Form

## 2025-05-12 NOTE — PROGRESS NOTES
Oscar is a 55 year old who is being evaluated via a billable telephone visit.    What phone number would you like to be contacted at? [phone]   How would you like to obtain your AVS? MyChart  Originating Location (pt. Location): Home  {PROVIDER LOCATION On-site should be selected for visits conducted from your clinic location or adjoining [facility] hospital, academic office, or other nearby [facility] building. Off-site should be selected for all other provider locations, including home:[address]}  Distant Location (provider location):  On-site  Telephone visit completed due to {audio only reason:329174}    {PROVIDER CHARTING PREFERENCE:520729}    Subjective   Oscar is a 55 year old, presenting for the following health issues:  Back Pain (Mid back pain starting last Tuesday. Dull when not doing anything but if moving around it become sharp )        5/12/2025     7:49 AM   Additional Questions   Roomed by Kyrstina Na     Back Pain           {SUPERLIST (Optional):782600}  {additonal problems for provider to add (Optional):627001}    {ROS Picklists (Optional):857222}      Objective    Vitals - Patient Reported  Weight (Patient Reported): 88.5 kg (195 lb)      Vitals:  No vitals were obtained today due to virtual visit.    Physical Exam   General: Alert and no distress //Respiratory: No audible wheeze, cough, or shortness of breath // Psychiatric:  Appropriate affect, tone, and pace of words      {Diagnostic Test Results (Optional):355951}      Phone call duration: *** minutes  Signed Electronically by: Neeraj Lindsey MD  {Email feedback regarding this note to [email or web address]   :552113}

## 2025-05-12 NOTE — TELEPHONE ENCOUNTER
Pt scheduled previously. Closing encounter.       Sam Christensen Jr., CMA on 5/12/2025 at 11:14 AM

## 2025-05-20 ENCOUNTER — E-VISIT (OUTPATIENT)
Dept: INTERNAL MEDICINE | Facility: CLINIC | Age: 55
End: 2025-05-20
Payer: COMMERCIAL

## 2025-05-20 DIAGNOSIS — Z00.00 ROUTINE GENERAL MEDICAL EXAMINATION AT A HEALTH CARE FACILITY: ICD-10-CM

## 2025-05-20 DIAGNOSIS — R51.9 INTRACTABLE HEADACHE, UNSPECIFIED CHRONICITY PATTERN, UNSPECIFIED HEADACHE TYPE: ICD-10-CM

## 2025-05-20 PROCEDURE — 99207 PR NO CHARGE LOS: CPT | Performed by: INTERNAL MEDICINE

## 2025-05-27 ENCOUNTER — HOSPITAL ENCOUNTER (OUTPATIENT)
Dept: MRI IMAGING | Facility: HOSPITAL | Age: 55
Discharge: HOME | End: 2025-05-27
Attending: INTERNAL MEDICINE
Payer: COMMERCIAL

## 2025-05-27 DIAGNOSIS — M54.6 ACUTE BILATERAL THORACIC BACK PAIN: ICD-10-CM

## 2025-05-27 DIAGNOSIS — M54.50 LUMBAR PAIN: ICD-10-CM

## 2025-05-27 PROCEDURE — 72146 MRI CHEST SPINE W/O DYE: CPT

## 2025-05-27 PROCEDURE — 72148 MRI LUMBAR SPINE W/O DYE: CPT

## 2025-05-28 ENCOUNTER — TRANSFERRED RECORDS (OUTPATIENT)
Dept: HEALTH INFORMATION MANAGEMENT | Facility: CLINIC | Age: 55
End: 2025-05-28
Payer: COMMERCIAL

## 2025-05-28 ENCOUNTER — RESULTS FOLLOW-UP (OUTPATIENT)
Dept: INTERNAL MEDICINE | Facility: CLINIC | Age: 55
End: 2025-05-28

## 2025-05-28 RX ORDER — IBUPROFEN 600 MG/1
600 TABLET, FILM COATED ORAL 3 TIMES DAILY
Qty: 90 TABLET | Refills: 11 | Status: SHIPPED | OUTPATIENT
Start: 2025-05-28

## 2025-05-28 RX ORDER — RIZATRIPTAN BENZOATE 5 MG/1
5 TABLET, ORALLY DISINTEGRATING ORAL
Qty: 18 TABLET | Refills: 2 | Status: SHIPPED | OUTPATIENT
Start: 2025-05-28

## 2025-05-30 ENCOUNTER — TRANSFERRED RECORDS (OUTPATIENT)
Dept: HEALTH INFORMATION MANAGEMENT | Facility: CLINIC | Age: 55
End: 2025-05-30
Payer: COMMERCIAL

## 2025-05-30 ENCOUNTER — MYC MEDICAL ADVICE (OUTPATIENT)
Dept: INTERNAL MEDICINE | Facility: CLINIC | Age: 55
End: 2025-05-30
Payer: COMMERCIAL

## 2025-05-30 DIAGNOSIS — R10.11 RIGHT UPPER QUADRANT PAIN: Primary | ICD-10-CM

## 2025-05-30 PROBLEM — D12.6 COLON ADENOMAS: Status: ACTIVE | Noted: 2025-05-30

## 2025-06-02 ENCOUNTER — TELEPHONE (OUTPATIENT)
Dept: INTERNAL MEDICINE | Facility: CLINIC | Age: 55
End: 2025-06-02

## 2025-06-02 ENCOUNTER — PATIENT OUTREACH (OUTPATIENT)
Dept: GASTROENTEROLOGY | Facility: CLINIC | Age: 55
End: 2025-06-02

## 2025-06-02 ENCOUNTER — VIRTUAL VISIT (OUTPATIENT)
Dept: INTERNAL MEDICINE | Facility: CLINIC | Age: 55
End: 2025-06-02
Payer: COMMERCIAL

## 2025-06-02 DIAGNOSIS — G44.009 CLUSTER HEADACHE, NOT INTRACTABLE, UNSPECIFIED CHRONICITY PATTERN: Primary | ICD-10-CM

## 2025-06-02 PROCEDURE — 98013 SYNCH AUDIO-ONLY EST LOW 20: CPT | Performed by: INTERNAL MEDICINE

## 2025-06-02 NOTE — PROGRESS NOTES
Otoniel Cook is a 55 year oldwho is being evaluated via a billable telephone visit.       What phone number would you like to be contacted at? [phone]      Assessment & Plan  (G44.009) Cluster headache, not intractable, unspecified chronicity pattern  (primary encounter diagnosis)  Comment: Intractable severe recurrent and limiting activity unable to work.  Uncertain as to return to work date.  Plans to see neurologist in follow-up.  Using Imitrex Maxalt and 600 mg ibuprofen.  No opioids.  Plan: Continue close follow-up encourage follow-up with neurologist.         20 minutes spent on the date of the encounter doing chart review, patient visit and documentation   Form completed this was extensive at the request of [employer] [address] [address], [place] [address] fax #[phone].  The patient was in his [facility] home for this telephonic virtual visit I was here at the [facility] in [facility] for this telephonic virtual visit completed today.  Extensive forms were completed on behalf of patient for return to work.  Off because of intractable severe recurrent cluster headaches.    Subjective  History as above remains off work.  Intractable severe recurrent cluster headaches.  Neurologic consultation has already been obtained and follow-up has been encouraged with neurology group.     Review of Systems   No blood in stool or urine no chest pain shortness of breath not using opioids.    Med list reviewed reconciled in the chart.  Disability insurance forms were completed from the [employer] as outlined above these were extensive.  Illness of cluster headaches began in early September 2023.       Neeraj Lindsey MD    The longitudinal plan of care for the diagnosis(es)/condition(s) as documented were addressed during this visit. Due to the added complexity in care, I will continue to support Oscar in the subsequent management and with ongoing continuity  of care.

## 2025-06-10 ENCOUNTER — PATIENT OUTREACH (OUTPATIENT)
Dept: CARE COORDINATION | Facility: CLINIC | Age: 55
End: 2025-06-10
Payer: COMMERCIAL

## 2025-06-11 NOTE — TELEPHONE ENCOUNTER
REFERRAL INFORMATION:  Referring Provider:  Neeraj Lindsey MD   Referring Clinic:  [facility] INTERNAL MEDICINE   Reason for Visit/Diagnosis: R10.11 (ICD-10-CM) - Right upper quadrant pain      FUTURE VISIT INFORMATION:  Appointment Date: 7/7/25     NOTES STATUS DETAILS   OFFICE NOTE from Referring Provider Internal 6/2/25   MEDICATION LIST Internal    PROCEDURES     ENDOSCOPY  Care Everywhere [facility]: 5/28/25   COLONOSCOPY Care Everywhere [facility]: 5/28/25, 2/25/21   STOOL TESTING     LABS     PERTINENT LABS Internal    PATHOLOGY REPORTS (RELATED) Care Everywhere [facility]:  EGD 5/28/25  Colonoscopy 5/28/25, 2/25/21   IMAGES     CT Internal 2/23/21-CT abd pel

## 2025-07-01 ENCOUNTER — MYC MEDICAL ADVICE (OUTPATIENT)
Dept: INTERNAL MEDICINE | Facility: CLINIC | Age: 55
End: 2025-07-01
Payer: COMMERCIAL

## 2025-07-07 ENCOUNTER — PRE VISIT (OUTPATIENT)
Dept: GASTROENTEROLOGY | Facility: CLINIC | Age: 55
End: 2025-07-07

## 2025-07-07 ENCOUNTER — PATIENT OUTREACH (OUTPATIENT)
Dept: CARE COORDINATION | Facility: CLINIC | Age: 55
End: 2025-07-07

## 2025-07-07 ENCOUNTER — VIRTUAL VISIT (OUTPATIENT)
Dept: INTERNAL MEDICINE | Facility: CLINIC | Age: 55
End: 2025-07-07
Payer: COMMERCIAL

## 2025-07-07 DIAGNOSIS — E78.2 MIXED HYPERLIPIDEMIA: ICD-10-CM

## 2025-07-07 DIAGNOSIS — G44.009 CLUSTER HEADACHE, NOT INTRACTABLE, UNSPECIFIED CHRONICITY PATTERN: Primary | ICD-10-CM

## 2025-07-07 PROCEDURE — 98013 SYNCH AUDIO-ONLY EST LOW 20: CPT | Performed by: INTERNAL MEDICINE

## 2025-07-07 NOTE — PROGRESS NOTES
Otoniel Cook is a 55 year oldwho is being evaluated via a billable telephone visit.       What phone number would you like to be contacted at? [phone]      Assessment & Plan  (G44.009) Cluster headache, not intractable, unspecified chronicity pattern  (primary encounter diagnosis)  Comment: Severe incapacitating and intractable are these headaches.  Unable to work date of diagnosis 9/1/2023.  Off work for the foreseeable future reevaluate December 2025.  Extensive forms completed on behalf of the patient regarding unable to work.  Plan: Headaches occur 2-3 times per week there is severe incapacitating he can seek and continue gainful employment at this degree of uncertainty as to when the headaches occur he has been seen by neurology.    (E78.2) Mixed hyperlipidemia  Comment: Latest LDL cholesterol 160+ up from previous 4 years ago 140+.  Plan: Successful weight loss should have helped.  He has lost more weight.  May be a candidate for statin therapy.  This would be for primary prevention as he has not had an MI or stroke.    The patient was in his [facility] home at the time of this telephonic virtual visit and I was here in the [place] clinic in [facility].  My conversation with the patient lasted 13 minutes.         15 minutes spent on the date of the encounter doing chart review, patient visit and documentation        Subjective  Continued symptoms of severe intractable cluster headaches.  2-3 times per week has been seen by neurologist previously had used Imitrex and Flexeril plus nonsteroidal anti-inflammatory drugs.  Ibuprofen.     Review of Systems   No blood in stool or urine no chest pain shortness of breath med list reviewed reconciled in the chart generally well-tolerated.       Neeraj Lindsey MD    The longitudinal plan of care for the diagnosis(es)/condition(s) as documented were addressed during this visit. Due to the added complexity in care, I will continue to  support Oscar in the subsequent management and with ongoing continuity of care.Otoniel Mcmlilanjuan is a 55 year oldwho is being evaluated via a billable telephone visit.       What phone number would you like to be contacted at? [phone]

## 2025-07-08 NOTE — TELEPHONE ENCOUNTER
Form completed during VV on 07/07. Form faxed to [phone]. Copy made for monthly hold bin and place in scan.

## 2025-08-04 ENCOUNTER — OFFICE VISIT (OUTPATIENT)
Dept: NEUROLOGY | Facility: CLINIC | Age: 55
End: 2025-08-04
Payer: COMMERCIAL

## 2025-08-04 VITALS
HEART RATE: 64 BPM | SYSTOLIC BLOOD PRESSURE: 156 MMHG | WEIGHT: 191 LBS | DIASTOLIC BLOOD PRESSURE: 92 MMHG | HEIGHT: 72 IN | BODY MASS INDEX: 25.87 KG/M2

## 2025-08-04 DIAGNOSIS — G44.029 CHRONIC CLUSTER HEADACHE, NOT INTRACTABLE: ICD-10-CM

## 2025-08-04 DIAGNOSIS — G43.E09 CHRONIC MIGRAINE WITH AURA WITHOUT STATUS MIGRAINOSUS, NOT INTRACTABLE: Primary | ICD-10-CM

## 2025-08-04 PROCEDURE — 3077F SYST BP >= 140 MM HG: CPT

## 2025-08-04 PROCEDURE — 3080F DIAST BP >= 90 MM HG: CPT

## 2025-08-04 PROCEDURE — 99214 OFFICE O/P EST MOD 30 MIN: CPT

## 2025-08-04 RX ORDER — SUMATRIPTAN 5 MG/1
1 SPRAY NASAL PRN
Qty: 1 EACH | Refills: 4 | Status: SHIPPED | OUTPATIENT
Start: 2025-08-04

## 2025-08-04 RX ORDER — NORTRIPTYLINE HYDROCHLORIDE 10 MG/1
30 CAPSULE ORAL AT BEDTIME
Qty: 90 CAPSULE | Refills: 3 | Status: SHIPPED | OUTPATIENT
Start: 2025-08-04